# Patient Record
Sex: MALE | Race: WHITE | NOT HISPANIC OR LATINO | Employment: OTHER | URBAN - METROPOLITAN AREA
[De-identification: names, ages, dates, MRNs, and addresses within clinical notes are randomized per-mention and may not be internally consistent; named-entity substitution may affect disease eponyms.]

---

## 2023-11-03 DIAGNOSIS — I10 PRIMARY HYPERTENSION: Primary | ICD-10-CM

## 2023-11-03 RX ORDER — AMLODIPINE AND BENAZEPRIL HYDROCHLORIDE 5; 20 MG/1; MG/1
1 CAPSULE ORAL 2 TIMES DAILY
COMMUNITY
End: 2024-02-26 | Stop reason: WASHOUT

## 2023-11-06 RX ORDER — AMLODIPINE AND BENAZEPRIL HYDROCHLORIDE 5; 20 MG/1; MG/1
1 CAPSULE ORAL 2 TIMES DAILY
Qty: 180 CAPSULE | Refills: 1 | Status: SHIPPED | OUTPATIENT
Start: 2023-11-06 | End: 2024-03-04

## 2023-11-07 DIAGNOSIS — F41.9 ANXIETY: Primary | ICD-10-CM

## 2023-11-07 DIAGNOSIS — G62.89 OTHER SPECIFIED POLYNEUROPATHIES: ICD-10-CM

## 2023-11-09 RX ORDER — TRAZODONE HYDROCHLORIDE 50 MG/1
50 TABLET ORAL NIGHTLY PRN
Qty: 90 TABLET | Refills: 0 | Status: SHIPPED | OUTPATIENT
Start: 2023-11-09 | End: 2023-11-28

## 2023-11-09 RX ORDER — HYDROCHLOROTHIAZIDE 25 MG/1
25 TABLET ORAL DAILY
Qty: 90 TABLET | Refills: 1 | Status: SHIPPED | OUTPATIENT
Start: 2023-11-09 | End: 2024-02-26 | Stop reason: SDUPTHER

## 2023-11-24 DIAGNOSIS — F41.9 ANXIETY: ICD-10-CM

## 2023-11-28 PROBLEM — R53.83 FATIGUE: Status: ACTIVE | Noted: 2023-11-28

## 2023-11-28 PROBLEM — C18.9 ADENOCARCINOMA OF COLON (MULTI): Status: ACTIVE | Noted: 2023-11-28

## 2023-11-28 PROBLEM — M79.10 MYALGIA: Status: ACTIVE | Noted: 2023-11-28

## 2023-11-28 PROBLEM — E66.9 OBESITY (BMI 30-39.9): Status: ACTIVE | Noted: 2023-11-28

## 2023-11-28 PROBLEM — M17.10 ARTHRITIS OF KNEE: Status: ACTIVE | Noted: 2023-11-28

## 2023-11-28 PROBLEM — M1A.0790 IDIOPATHIC CHRONIC GOUT OF FOOT WITHOUT TOPHUS: Status: ACTIVE | Noted: 2023-11-28

## 2023-11-28 PROBLEM — M79.89 SWELLING OF BOTH LOWER EXTREMITIES: Status: ACTIVE | Noted: 2023-11-28

## 2023-11-28 PROBLEM — C18.9 COLON CANCER (MULTI): Status: ACTIVE | Noted: 2023-11-28

## 2023-11-28 PROBLEM — I10 HTN (HYPERTENSION): Status: ACTIVE | Noted: 2023-11-28

## 2023-11-28 PROBLEM — E78.1 HYPERTRIGLYCERIDEMIA: Status: ACTIVE | Noted: 2023-11-28

## 2023-11-28 PROBLEM — R07.9 CHEST PAIN: Status: ACTIVE | Noted: 2023-11-28

## 2023-11-28 PROBLEM — M25.562 BILATERAL KNEE PAIN: Status: ACTIVE | Noted: 2023-11-28

## 2023-11-28 PROBLEM — R11.0 NAUSEA IN ADULT: Status: ACTIVE | Noted: 2023-11-28

## 2023-11-28 PROBLEM — R19.5 POSITIVE COLORECTAL CANCER SCREENING USING DNA-BASED STOOL TEST: Status: ACTIVE | Noted: 2023-11-28

## 2023-11-28 PROBLEM — Z20.822 EXPOSURE TO COVID-19 VIRUS: Status: ACTIVE | Noted: 2023-11-28

## 2023-11-28 PROBLEM — Z90.49 STATUS POST PARTIAL COLECTOMY: Status: ACTIVE | Noted: 2023-11-28

## 2023-11-28 PROBLEM — R74.8 ELEVATED LIVER ENZYMES: Status: ACTIVE | Noted: 2023-11-28

## 2023-11-28 PROBLEM — M1A.9XX0 CHRONIC GOUT: Status: ACTIVE | Noted: 2023-11-28

## 2023-11-28 PROBLEM — M25.561 BILATERAL KNEE PAIN: Status: ACTIVE | Noted: 2023-11-28

## 2023-11-28 PROBLEM — G62.9 PERIPHERAL NEUROPATHY: Status: ACTIVE | Noted: 2023-11-28

## 2023-11-28 RX ORDER — ACETAMINOPHEN AND CODEINE PHOSPHATE 300; 30 MG/1; MG/1
1 TABLET ORAL EVERY 6 HOURS PRN
COMMUNITY
Start: 2023-08-08 | End: 2024-02-26 | Stop reason: WASHOUT

## 2023-11-28 RX ORDER — HYDROCHLOROTHIAZIDE 12.5 MG/1
1 TABLET ORAL DAILY
COMMUNITY
Start: 2022-04-06 | End: 2024-02-26 | Stop reason: WASHOUT

## 2023-11-28 RX ORDER — TRAZODONE HYDROCHLORIDE 50 MG/1
50 TABLET ORAL NIGHTLY PRN
Qty: 90 TABLET | Refills: 0 | Status: SHIPPED | OUTPATIENT
Start: 2023-11-28

## 2023-11-28 RX ORDER — OXYCODONE AND ACETAMINOPHEN 5; 325 MG/1; MG/1
1 TABLET ORAL EVERY 4 HOURS PRN
COMMUNITY
End: 2024-02-26 | Stop reason: WASHOUT

## 2023-11-28 RX ORDER — TERBINAFINE HYDROCHLORIDE 250 MG/1
1 TABLET ORAL DAILY
COMMUNITY
Start: 2023-11-07 | End: 2023-12-07

## 2023-11-28 RX ORDER — PANTOPRAZOLE SODIUM 40 MG/1
1 TABLET, DELAYED RELEASE ORAL 2 TIMES DAILY
COMMUNITY
Start: 2023-09-05

## 2023-11-28 RX ORDER — TIZANIDINE 2 MG/1
1 TABLET ORAL NIGHTLY
COMMUNITY
End: 2024-05-13

## 2023-11-28 RX ORDER — METOPROLOL SUCCINATE 25 MG/1
1 TABLET, EXTENDED RELEASE ORAL DAILY
COMMUNITY
Start: 2023-11-24 | End: 2024-02-26 | Stop reason: DRUGHIGH

## 2024-02-12 DIAGNOSIS — M79.10 MYALGIA, UNSPECIFIED SITE: ICD-10-CM

## 2024-02-12 DIAGNOSIS — I10 ESSENTIAL (PRIMARY) HYPERTENSION: ICD-10-CM

## 2024-02-13 RX ORDER — METOPROLOL SUCCINATE 25 MG/1
25 TABLET, EXTENDED RELEASE ORAL DAILY
Qty: 90 TABLET | Refills: 1 | OUTPATIENT
Start: 2024-02-13

## 2024-02-13 RX ORDER — TIZANIDINE 2 MG/1
2 TABLET ORAL NIGHTLY
Qty: 90 TABLET | Refills: 1 | OUTPATIENT
Start: 2024-02-13

## 2024-02-25 PROBLEM — C18.9 ADENOCARCINOMA OF COLON (MULTI): Status: RESOLVED | Noted: 2023-11-28 | Resolved: 2024-02-25

## 2024-02-25 PROBLEM — Z20.822 EXPOSURE TO COVID-19 VIRUS: Status: RESOLVED | Noted: 2023-11-28 | Resolved: 2024-02-25

## 2024-02-25 PROBLEM — M1A.0790 IDIOPATHIC CHRONIC GOUT OF FOOT WITHOUT TOPHUS: Status: RESOLVED | Noted: 2023-11-28 | Resolved: 2024-02-25

## 2024-02-26 ENCOUNTER — OFFICE VISIT (OUTPATIENT)
Dept: PRIMARY CARE | Facility: CLINIC | Age: 64
End: 2024-02-26
Payer: COMMERCIAL

## 2024-02-26 VITALS
DIASTOLIC BLOOD PRESSURE: 68 MMHG | SYSTOLIC BLOOD PRESSURE: 110 MMHG | TEMPERATURE: 97.7 F | HEART RATE: 75 BPM | OXYGEN SATURATION: 98 % | WEIGHT: 267.4 LBS | BODY MASS INDEX: 32.56 KG/M2 | HEIGHT: 76 IN

## 2024-02-26 DIAGNOSIS — Z90.49 STATUS POST PARTIAL COLECTOMY: ICD-10-CM

## 2024-02-26 DIAGNOSIS — R23.3 EASY BRUISING: ICD-10-CM

## 2024-02-26 DIAGNOSIS — G62.89 OTHER POLYNEUROPATHY: ICD-10-CM

## 2024-02-26 DIAGNOSIS — C18.9 MALIGNANT NEOPLASM OF COLON, UNSPECIFIED PART OF COLON (MULTI): ICD-10-CM

## 2024-02-26 DIAGNOSIS — R25.1 TREMOR: Primary | ICD-10-CM

## 2024-02-26 DIAGNOSIS — R35.1 NOCTURIA: ICD-10-CM

## 2024-02-26 DIAGNOSIS — F10.20 ALCOHOL DEPENDENCE, DAILY USE (MULTI): ICD-10-CM

## 2024-02-26 DIAGNOSIS — R53.82 CHRONIC FATIGUE: ICD-10-CM

## 2024-02-26 DIAGNOSIS — R26.9 GAIT DIFFICULTY: ICD-10-CM

## 2024-02-26 DIAGNOSIS — R94.5 ABNORMAL LIVER FUNCTION: ICD-10-CM

## 2024-02-26 DIAGNOSIS — M10.9 GOUT, UNSPECIFIED CAUSE, UNSPECIFIED CHRONICITY, UNSPECIFIED SITE: ICD-10-CM

## 2024-02-26 DIAGNOSIS — R11.0 NAUSEA: ICD-10-CM

## 2024-02-26 DIAGNOSIS — M25.511 RIGHT SHOULDER PAIN, UNSPECIFIED CHRONICITY: ICD-10-CM

## 2024-02-26 DIAGNOSIS — E78.1 HYPERTRIGLYCERIDEMIA: ICD-10-CM

## 2024-02-26 DIAGNOSIS — I10 PRIMARY HYPERTENSION: ICD-10-CM

## 2024-02-26 PROBLEM — R19.5 POSITIVE COLORECTAL CANCER SCREENING USING DNA-BASED STOOL TEST: Status: RESOLVED | Noted: 2023-11-28 | Resolved: 2024-02-26

## 2024-02-26 PROBLEM — Z98.890 HISTORY OF BACK SURGERY: Status: ACTIVE | Noted: 2024-02-26

## 2024-02-26 PROBLEM — R07.9 CHEST PAIN: Status: RESOLVED | Noted: 2023-11-28 | Resolved: 2024-02-26

## 2024-02-26 PROBLEM — M25.519 SHOULDER PAIN: Status: ACTIVE | Noted: 2024-02-26

## 2024-02-26 PROBLEM — M79.89 SWELLING OF BOTH LOWER EXTREMITIES: Status: RESOLVED | Noted: 2023-11-28 | Resolved: 2024-02-26

## 2024-02-26 PROBLEM — M79.10 MYALGIA: Status: RESOLVED | Noted: 2023-11-28 | Resolved: 2024-02-26

## 2024-02-26 PROCEDURE — 99214 OFFICE O/P EST MOD 30 MIN: CPT | Performed by: INTERNAL MEDICINE

## 2024-02-26 PROCEDURE — 3078F DIAST BP <80 MM HG: CPT | Performed by: INTERNAL MEDICINE

## 2024-02-26 PROCEDURE — 3074F SYST BP LT 130 MM HG: CPT | Performed by: INTERNAL MEDICINE

## 2024-02-26 PROCEDURE — 1036F TOBACCO NON-USER: CPT | Performed by: INTERNAL MEDICINE

## 2024-02-26 RX ORDER — ALLOPURINOL 300 MG/1
300 TABLET ORAL DAILY PRN
COMMUNITY
Start: 2017-09-27

## 2024-02-26 RX ORDER — METOPROLOL SUCCINATE 50 MG/1
50 TABLET, EXTENDED RELEASE ORAL DAILY
Qty: 90 TABLET | Refills: 3 | Status: SHIPPED | OUTPATIENT
Start: 2024-02-26

## 2024-02-26 ASSESSMENT — PATIENT HEALTH QUESTIONNAIRE - PHQ9
SUM OF ALL RESPONSES TO PHQ9 QUESTIONS 1 & 2: 0
2. FEELING DOWN, DEPRESSED OR HOPELESS: NOT AT ALL
1. LITTLE INTEREST OR PLEASURE IN DOING THINGS: NOT AT ALL

## 2024-02-26 ASSESSMENT — PAIN SCALES - GENERAL: PAINLEVEL: 2

## 2024-02-26 NOTE — PROGRESS NOTES
"Standard Progress Note  Chief Complaint   Patient presents with    Med Refill    Establish Care     Former pt of Dr. Lee here to establish with Dr. Salomon and needs refills.       HPI:  Oswaldo Aguilar 63 y.o.   male    Patient Active Problem List   Diagnosis    Arthritis of knee    Bilateral knee pain    Chronic gout    Colon cancer (CMS/HCC)    Elevated liver enzymes    Fatigue    HTN (hypertension)    Hypertriglyceridemia    Obesity (BMI 30-39.9)    Peripheral neuropathy    Status post partial colectomy    Shoulder pain    Tremor    History of back surgery    Alcohol dependence, daily use (CMS/HCC)           Past Surgical History:   Procedure Laterality Date    OTHER SURGICAL HISTORY  2022    Large bowel biopsy    SPINE SURGERY         Social History     Social History Narrative    . No children    Disabled from back issues. Previously [employer]    +drinks beer daily       Family History   Problem Relation Name Age of Onset    Psoriasis Mother           55    Liver disease Mother      Coronary artery disease Father           67    Emphysema Father      Gout Father's Brother          Blood pressure 110/68, pulse 75, temperature 36.5 °C (97.7 °F), height 1.93 m (6' 4\"), weight 121 kg (267 lb 6.4 oz), SpO2 98 %.  Body mass index is 32.55 kg/m².          Vital reviewed  NC/AT  Eyes: PERRL,   Neck: no cervical/clavicular adenopathy. No goiter  CV: RRR S1 S2 normal. No murmur. No carotid bruit.   Lungs: CTA without wrr. Breath sounds symmetric  Abdomen: normoactive. Soft, nontender.limited exam. Surgical scars.   Extremities: no pretibial edema  Neuro: speech intact.   Skin: no rash noted.       Advised to stop hydrochlorothiazide as bp is under control and has history of gout.   Regarding colon cancer he is planning to follow up with GI. He thinks he is due for scope.     Problem List Items Addressed This Visit       Colon cancer (CMS/HCC)    Overview     2022. " Surgery. GI: Dr Fajardo. Surgeon: Dr MAGDALENA King.          Fatigue    Relevant Orders    Vitamin B12    Folate    Magnesium    HTN (hypertension)    Relevant Medications    metoprolol succinate XL (Toprol-XL) 50 mg 24 hr tablet    Hypertriglyceridemia    Relevant Orders    Comprehensive Metabolic Panel    Lipid panel    Peripheral neuropathy    Relevant Orders    Vitamin B12    Magnesium    Status post partial colectomy    Shoulder pain    Tremor - Primary    Relevant Orders    Referral to Neurology    Tsh With Reflex To Free T4 If Abnormal    Alcohol dependence, daily use (CMS/HCC)     Other Visit Diagnoses       Gait difficulty        Relevant Orders    Referral to Neurology    Nausea        Relevant Orders    Comprehensive Metabolic Panel    Magnesium    Abnormal liver function        Relevant Orders    Comprehensive Metabolic Panel    CBC    Vitamin B12    Folate    Nocturia        Relevant Orders    PSA    Easy bruising        Relevant Orders    Protime-INR    APTT    Gout, unspecified cause, unspecified chronicity, unspecified site        Relevant Orders    Uric acid                  Current Outpatient Medications on File Prior to Visit   Medication Sig Dispense Refill    allopurinol (Zyloprim) 300 mg tablet Take 1 tablet (300 mg) by mouth once daily as needed.      amLODIPine-benazepriL (Lotrel) 5-20 mg capsule TAKE 1 CAPSULE BY MOUTH TWICE A  capsule 1    pantoprazole (ProtoNix) 40 mg EC tablet Take 1 tablet (40 mg) by mouth 2 times a day.      tiZANidine (Zanaflex) 2 mg tablet Take 0.5 tablets (1 mg) by mouth once daily at bedtime.      traZODone (Desyrel) 50 mg tablet TAKE 1 TABLET BY MOUTH EVERY DAY AT BEDTIME AS NEEDED 90 tablet 0    [DISCONTINUED] hydroCHLOROthiazide (HYDRODiuril) 12.5 mg tablet Take 1 tablet (12.5 mg) by mouth once daily.      [DISCONTINUED] metoprolol succinate XL (Toprol-XL) 25 mg 24 hr tablet Take 1 tablet (25 mg) by mouth once daily.      [DISCONTINUED]  acetaminophen-codeine (Tylenol w/ Codeine #3) 300-30 mg tablet Take 1 tablet by mouth every 6 hours if needed. To 8 hours PRN      [DISCONTINUED] amLODIPine-benazepriL (Lotrel) 5-20 mg capsule Take 1 capsule by mouth 2 times a day.      [DISCONTINUED] hydroCHLOROthiazide (HYDRODiuril) 25 mg tablet TAKE 1 TABLET BY MOUTH EVERY DAY 90 tablet 1    [DISCONTINUED] oxyCODONE-acetaminophen (Percocet) 5-325 mg tablet Take 1 tablet by mouth every 4 hours if needed.       No current facility-administered medications on file prior to visit.         Kassy Salomon MD

## 2024-03-04 DIAGNOSIS — I10 PRIMARY HYPERTENSION: ICD-10-CM

## 2024-03-04 RX ORDER — AMLODIPINE AND BENAZEPRIL HYDROCHLORIDE 5; 20 MG/1; MG/1
1 CAPSULE ORAL 2 TIMES DAILY
Qty: 180 CAPSULE | Refills: 0 | Status: SHIPPED | OUTPATIENT
Start: 2024-03-04 | End: 2024-05-17

## 2024-03-06 ENCOUNTER — TELEPHONE (OUTPATIENT)
Dept: PRIMARY CARE | Facility: CLINIC | Age: 64
End: 2024-03-06
Payer: COMMERCIAL

## 2024-03-06 NOTE — TELEPHONE ENCOUNTER
----- Message from Kassy Chavez MD sent at 3/4/2024  9:25 AM EST -----  Refilled medication. Please obtain lab work as overdue to have electrolytes and kidney function checked.

## 2024-03-06 NOTE — TELEPHONE ENCOUNTER
"Called pt and informed.  States \"We need a list of the labs because we don't know where we need to go now that you've moved.\"  Mailed lab orders and list of [facility] labs to pt.  "

## 2024-05-05 DIAGNOSIS — I10 PRIMARY HYPERTENSION: ICD-10-CM

## 2024-05-09 ENCOUNTER — APPOINTMENT (OUTPATIENT)
Dept: NEUROLOGY | Facility: CLINIC | Age: 64
End: 2024-05-09
Payer: COMMERCIAL

## 2024-05-13 DIAGNOSIS — I10 HYPERTENSION, UNSPECIFIED TYPE: ICD-10-CM

## 2024-05-13 DIAGNOSIS — Z98.890 HISTORY OF BACK SURGERY: Primary | ICD-10-CM

## 2024-05-13 RX ORDER — TIZANIDINE 2 MG/1
2 TABLET ORAL NIGHTLY
Qty: 90 TABLET | Refills: 1 | Status: SHIPPED | OUTPATIENT
Start: 2024-05-13

## 2024-05-13 RX ORDER — METOPROLOL SUCCINATE 25 MG/1
25 TABLET, EXTENDED RELEASE ORAL DAILY
Qty: 90 TABLET | Refills: 1 | Status: SHIPPED | OUTPATIENT
Start: 2024-05-13

## 2024-05-14 RX ORDER — AMLODIPINE AND BENAZEPRIL HYDROCHLORIDE 5; 20 MG/1; MG/1
1 CAPSULE ORAL 2 TIMES DAILY
Qty: 180 CAPSULE | Refills: 0 | OUTPATIENT
Start: 2024-05-14

## 2024-05-17 ENCOUNTER — TELEPHONE (OUTPATIENT)
Dept: PHARMACY | Facility: HOSPITAL | Age: 64
End: 2024-05-17
Payer: COMMERCIAL

## 2024-05-17 DIAGNOSIS — I10 PRIMARY HYPERTENSION: ICD-10-CM

## 2024-05-17 RX ORDER — AMLODIPINE AND BENAZEPRIL HYDROCHLORIDE 5; 20 MG/1; MG/1
1 CAPSULE ORAL 2 TIMES DAILY
Qty: 60 CAPSULE | Refills: 0 | Status: SHIPPED | OUTPATIENT
Start: 2024-05-17

## 2024-05-17 NOTE — TELEPHONE ENCOUNTER
Population Health: Outreach by Ambulatory Pharmacy Team    Payor: [employer] [place]  Reason: Adherence  Medication: AMLOD/BENAZP CAP 5-20MG  Outcome: Left Voicemail    Thomas Vanegas, PharmD

## 2024-05-20 NOTE — TELEPHONE ENCOUNTER
----- Message from Kassy Chavez MD sent at 5/17/2024  4:12 PM EDT -----  Refilled medication for 30 days. Looks like needs labs. Please obtain labs as ordered.      Methotrexate Pregnancy And Lactation Text: This medication is Pregnancy Category X and is known to cause fetal harm. This medication is excreted in breast milk.

## 2024-05-21 NOTE — TELEPHONE ENCOUNTER
----- Message from Kassy Chavez MD sent at 5/17/2024  4:12 PM EDT -----  Refilled medication for 30 days. Looks like needs labs. Please obtain labs as ordered.

## 2024-06-03 ENCOUNTER — PATIENT OUTREACH (OUTPATIENT)
Dept: CARDIOLOGY | Facility: CLINIC | Age: 64
End: 2024-06-03
Payer: COMMERCIAL

## 2024-06-03 NOTE — PROGRESS NOTES
Discharge Facility:  [facility]  Discharge Diagnosis:  Lower GI Bleed, Rectal bleed  Admission Date:  5/29/24  Discharge Date: 6/1/24    PCP Appointment Date: Dr Chavez sending a task for sooner appt  Specialist Appointment Date: Neuro 8/20/24  Hospital Encounter and Summary: Linked      Two attempts were made to reach patient within two business days after discharge. Voicemail left with contact information for patient to call back with any non-emergent questions or concerns.

## 2024-06-05 ENCOUNTER — TELEPHONE (OUTPATIENT)
Dept: PRIMARY CARE | Facility: CLINIC | Age: 64
End: 2024-06-05
Payer: COMMERCIAL

## 2024-06-05 NOTE — TELEPHONE ENCOUNTER
----- Message from Kassy Chavez MD sent at 6/4/2024  6:03 PM EDT -----  Check with Dr Fajardo's office.   ----- Message -----  From: Karis Hayes LPN  Sent: 6/4/2024   9:22 AM EDT  To: Kassy Chavez MD    Did you want me to check with the pt or with a doctors office?  ----- Message -----  From: Kassy Chavez MD  Sent: 6/3/2024   5:11 PM EDT  To: Karis Hayes LPN    Received notes from scopes. Indicates vocal cord lesion. Did they recommend pt see ENT?

## 2024-06-05 NOTE — TELEPHONE ENCOUNTER
Called office of Dr. Fajardo and spoke with Suzanne.  Will send message to MA to contact clinical.

## 2024-06-05 NOTE — TELEPHONE ENCOUNTER
Called pt.  Was advised to follow up with ENT, but have not made an appt yet.  Provided with recommended names and phone numbers.

## 2024-06-05 NOTE — TELEPHONE ENCOUNTER
"Received return call from Laura at Dr. Fajardo's office.  Scope done by Dr. Gardner on 5/31/24 states \"Consider ENT evaluation of vocal cord nodule.\"  "

## 2024-06-14 ENCOUNTER — APPOINTMENT (OUTPATIENT)
Dept: PRIMARY CARE | Facility: CLINIC | Age: 64
End: 2024-06-14
Payer: COMMERCIAL

## 2024-06-14 VITALS
TEMPERATURE: 97.5 F | BODY MASS INDEX: 32.1 KG/M2 | SYSTOLIC BLOOD PRESSURE: 102 MMHG | WEIGHT: 263.6 LBS | HEIGHT: 76 IN | DIASTOLIC BLOOD PRESSURE: 62 MMHG | OXYGEN SATURATION: 98 % | HEART RATE: 68 BPM

## 2024-06-14 DIAGNOSIS — R79.0 LOW MAGNESIUM LEVEL: ICD-10-CM

## 2024-06-14 DIAGNOSIS — F10.20 ALCOHOL DEPENDENCE, DAILY USE (MULTI): ICD-10-CM

## 2024-06-14 DIAGNOSIS — K92.2 GASTROINTESTINAL HEMORRHAGE, UNSPECIFIED GASTROINTESTINAL HEMORRHAGE TYPE: Primary | ICD-10-CM

## 2024-06-14 DIAGNOSIS — D64.9 ANEMIA, UNSPECIFIED TYPE: ICD-10-CM

## 2024-06-14 PROBLEM — Z98.1 S/P LUMBAR FUSION: Status: ACTIVE | Noted: 2018-12-13

## 2024-06-14 PROBLEM — M1A.9XX0 CHRONIC GOUT: Status: RESOLVED | Noted: 2023-11-28 | Resolved: 2024-06-14

## 2024-06-14 PROBLEM — K21.9 GASTROESOPHAGEAL REFLUX DISEASE WITHOUT ESOPHAGITIS: Status: ACTIVE | Noted: 2024-06-14

## 2024-06-14 PROBLEM — M17.0 PRIMARY OSTEOARTHRITIS OF BOTH KNEES: Status: ACTIVE | Noted: 2018-12-13

## 2024-06-14 PROBLEM — M96.1 POSTLAMINECTOMY SYNDROME OF LUMBAR REGION: Status: ACTIVE | Noted: 2019-02-12

## 2024-06-14 RX ORDER — HYDROCHLOROTHIAZIDE 25 MG/1
1 TABLET ORAL
COMMUNITY
Start: 2024-05-17 | End: 2024-06-14 | Stop reason: WASHOUT

## 2024-06-14 RX ORDER — CHOLECALCIFEROL (VITAMIN D3) 50 MCG
50 TABLET ORAL DAILY
COMMUNITY

## 2024-06-14 ASSESSMENT — PATIENT HEALTH QUESTIONNAIRE - PHQ9
SUM OF ALL RESPONSES TO PHQ9 QUESTIONS 1 & 2: 2
2. FEELING DOWN, DEPRESSED OR HOPELESS: SEVERAL DAYS
10. IF YOU CHECKED OFF ANY PROBLEMS, HOW DIFFICULT HAVE THESE PROBLEMS MADE IT FOR YOU TO DO YOUR WORK, TAKE CARE OF THINGS AT HOME, OR GET ALONG WITH OTHER PEOPLE: NOT DIFFICULT AT ALL
1. LITTLE INTEREST OR PLEASURE IN DOING THINGS: SEVERAL DAYS

## 2024-06-14 ASSESSMENT — PAIN SCALES - GENERAL: PAINLEVEL: 3

## 2024-06-14 NOTE — PROGRESS NOTES
Standard Progress Note  Chief Complaint   Patient presents with    Hospital Follow-up     Pt here for FUV discharge from [facility] on 6/1/24   Accompanied by family.  Recent admission for lower GI bleed.  Per records have scallops.  This included EGD and colonoscopy. 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  Colonoscopy showed diverticulosis, partial cecectomy, partial left hemicolectomy.  EGD showed nodule left vocal cord and area with appearance of Ambrose's.  He presents for follow-up of hospitalization.  No bleeding since he left the hospital.  He states that he drinks about 6-12 beers a day.  He has done this for many years.  He states that his family he has always done this.  We discussed that this is on excessive amount of alcohol and concern regarding potential liver damage.  Did advise him to check with gastroenterologist regarding doing imaging of the liver.  We also discussed that during his admission his liver tests were abnormal.    He has an appointment with ENT on June 19 Dr Aponte    HPI:  Oswaldo Jeff 63 y.o.   male hospital follow.     Patient Active Problem List   Diagnosis    Arthritis of knee    Bilateral knee pain    Colon cancer (Multi)    Elevated liver enzymes    Fatigue    HTN (hypertension)    Hypertriglyceridemia    Obesity (BMI 30-39.9)    Peripheral neuropathy    Status post partial colectomy    Shoulder pain    Tremor    History of back surgery    Alcohol dependence, daily use (Multi)    Gout    Spinal stenosis of lumbar region    S/P lumbar fusion    Primary osteoarthritis of both knees    Postlaminectomy syndrome of lumbar region    Gastroesophageal reflux disease without esophagitis           Past Surgical History:   Procedure Laterality Date    OTHER SURGICAL HISTORY  11/08/2022    Large bowel biopsy    SPINE SURGERY         Social History     Social History Narrative    . No children    Disabled from back issues. Previously [employer]    +drinks  "beer daily       Family History   Problem Relation Name Age of Onset    Psoriasis Mother           55    Liver disease Mother      Coronary artery disease Father           67    Emphysema Father      Gout Father's Brother          Blood pressure 102/62, pulse 68, temperature 36.4 °C (97.5 °F), height 1.93 m (6' 4\"), weight 120 kg (263 lb 9.6 oz), SpO2 98%.  Body mass index is 32.09 kg/m².  1. Gastrointestinal hemorrhage, unspecified gastrointestinal hemorrhage type  No problem with bleeding since his admission.  He is not having any constipation now.  He is taking Colace.  He did have constipation prior to his admission and states that he was straining a lot.  He is no longer doing this.  Will check lab work now.  He has follow-up with gastroenterologist.  - CBC; Future  - Comprehensive Metabolic Panel; Future    2. Anemia, unspecified type  .  As above we will obtain lab work.  - CBC; Future  - Comprehensive Metabolic Panel; Future    3. Alcohol dependence, daily use (Multi)  Long discussion recommend folic acid B complex vitamin.  Advised.  He did state easier said then done.  Advised him to talk to gastroenterologist regarding imaging to try and get a better sense of amount of liver damage has occurred.  - Comprehensive Metabolic Panel; Future    After visit added level to labs.    He is scheduled for annual wellness later this month.  Since he was seen here today he can move that appointment to anytime in  or September for his annual wellness  Current Outpatient Medications on File Prior to Visit   Medication Sig Dispense Refill    allopurinol (Zyloprim) 300 mg tablet Take 1 tablet (300 mg) by mouth once daily as needed.      amLODIPine-benazepriL (Lotrel) 5-20 mg capsule TAKE 1 CAPSULE BY MOUTH TWICE A DAY 60 capsule 0    cholecalciferol (Vitamin D3) 50 MCG (2000) tablet Take 1 tablet (50 mcg) by mouth once daily.      hydroCHLOROthiazide (HYDRODiuril) 25 mg tablet Take 1 tablet " (25 mg) by mouth early in the morning..      metoprolol succinate XL (Toprol-XL) 50 mg 24 hr tablet Take 1 tablet (50 mg) by mouth once daily. Do not crush or chew. 90 tablet 3    pantoprazole (ProtoNix) 40 mg EC tablet Take 1 tablet (40 mg) by mouth 2 times a day.      metoprolol succinate XL (Toprol-XL) 25 mg 24 hr tablet TAKE 1 TABLET BY MOUTH EVERY DAY (Patient not taking: Reported on 6/14/2024) 90 tablet 1    tiZANidine (Zanaflex) 2 mg tablet TAKE 1 TABLET BY MOUTH EVERYDAY AT BEDTIME (Patient not taking: Reported on 6/14/2024) 90 tablet 1    traZODone (Desyrel) 50 mg tablet TAKE 1 TABLET BY MOUTH EVERY DAY AT BEDTIME AS NEEDED (Patient not taking: Reported on 6/14/2024) 90 tablet 0     No current facility-administered medications on file prior to visit.         Kassy Salomon MD

## 2024-06-24 ENCOUNTER — PATIENT OUTREACH (OUTPATIENT)
Dept: CARDIOLOGY | Facility: CLINIC | Age: 64
End: 2024-06-24

## 2024-06-24 ENCOUNTER — APPOINTMENT (OUTPATIENT)
Dept: PRIMARY CARE | Facility: CLINIC | Age: 64
End: 2024-06-24
Payer: MEDICAID

## 2024-06-24 NOTE — PROGRESS NOTES
Unable to reach patient for call back after patient's follow up appointment with PCP.   HEROM with call back number for patient to call if needed   If no voicemail available call attempts x 2 were made to contact the patient to assist with any questions or concerns patient may have.

## 2024-07-22 ENCOUNTER — APPOINTMENT (OUTPATIENT)
Dept: PRIMARY CARE | Facility: CLINIC | Age: 64
End: 2024-07-22
Payer: MEDICAID

## 2024-08-12 NOTE — ADDENDUM NOTE
Addended by: ESSIE MEYER on: 8/12/2024 08:45 AM     Modules accepted: Orders     Additional Notes: Patient consent was obtained to proceed with the visit and recommended plan of care after discussion of all risks and benefits, including the risks of COVID-19 exposure. Detail Level: Simple Detail Level: Zone Additional Notes: Refer to Dr. Tran or PCP for scar revision consult

## 2024-08-17 DIAGNOSIS — G62.89 OTHER SPECIFIED POLYNEUROPATHIES: ICD-10-CM

## 2024-08-19 RX ORDER — HYDROCHLOROTHIAZIDE 25 MG/1
25 TABLET ORAL DAILY
Qty: 90 TABLET | Refills: 0 | Status: SHIPPED | OUTPATIENT
Start: 2024-08-19 | End: 2024-08-23 | Stop reason: SDUPTHER

## 2024-08-20 ENCOUNTER — APPOINTMENT (OUTPATIENT)
Dept: NEUROLOGY | Facility: CLINIC | Age: 64
End: 2024-08-20
Payer: COMMERCIAL

## 2024-08-23 ENCOUNTER — LAB (OUTPATIENT)
Dept: LAB | Facility: LAB | Age: 64
End: 2024-08-23
Payer: COMMERCIAL

## 2024-08-23 ENCOUNTER — OFFICE VISIT (OUTPATIENT)
Dept: PRIMARY CARE | Facility: CLINIC | Age: 64
End: 2024-08-23
Payer: COMMERCIAL

## 2024-08-23 VITALS
WEIGHT: 265.2 LBS | HEART RATE: 100 BPM | OXYGEN SATURATION: 97 % | BODY MASS INDEX: 32.29 KG/M2 | TEMPERATURE: 98 F | SYSTOLIC BLOOD PRESSURE: 140 MMHG | DIASTOLIC BLOOD PRESSURE: 90 MMHG | HEIGHT: 76 IN

## 2024-08-23 DIAGNOSIS — K21.9 GASTROESOPHAGEAL REFLUX DISEASE WITHOUT ESOPHAGITIS: Primary | ICD-10-CM

## 2024-08-23 DIAGNOSIS — M10.9 GOUT, UNSPECIFIED CAUSE, UNSPECIFIED CHRONICITY, UNSPECIFIED SITE: ICD-10-CM

## 2024-08-23 DIAGNOSIS — I10 PRIMARY HYPERTENSION: ICD-10-CM

## 2024-08-23 DIAGNOSIS — E78.1 HYPERTRIGLYCERIDEMIA: ICD-10-CM

## 2024-08-23 DIAGNOSIS — R79.0 LOW MAGNESIUM LEVEL: ICD-10-CM

## 2024-08-23 DIAGNOSIS — R23.3 EASY BRUISING: ICD-10-CM

## 2024-08-23 DIAGNOSIS — D64.9 ANEMIA, UNSPECIFIED TYPE: ICD-10-CM

## 2024-08-23 DIAGNOSIS — G62.89 OTHER POLYNEUROPATHY: ICD-10-CM

## 2024-08-23 DIAGNOSIS — R11.0 NAUSEA: ICD-10-CM

## 2024-08-23 DIAGNOSIS — Z11.4 SCREENING FOR HIV (HUMAN IMMUNODEFICIENCY VIRUS): ICD-10-CM

## 2024-08-23 DIAGNOSIS — G62.89 OTHER SPECIFIED POLYNEUROPATHIES: ICD-10-CM

## 2024-08-23 DIAGNOSIS — J38.2 NODULES OF VOCAL CORDS: ICD-10-CM

## 2024-08-23 DIAGNOSIS — R25.1 TREMOR: ICD-10-CM

## 2024-08-23 DIAGNOSIS — R71.8 MICROCYTOSIS: Primary | ICD-10-CM

## 2024-08-23 DIAGNOSIS — Z00.00 ROUTINE GENERAL MEDICAL EXAMINATION AT HEALTH CARE FACILITY: ICD-10-CM

## 2024-08-23 DIAGNOSIS — Z11.59 NEED FOR HEPATITIS C SCREENING TEST: ICD-10-CM

## 2024-08-23 DIAGNOSIS — R35.1 NOCTURIA: ICD-10-CM

## 2024-08-23 DIAGNOSIS — F10.20 ALCOHOL DEPENDENCE, DAILY USE (MULTI): ICD-10-CM

## 2024-08-23 DIAGNOSIS — R53.82 CHRONIC FATIGUE: ICD-10-CM

## 2024-08-23 DIAGNOSIS — K92.2 GASTROINTESTINAL HEMORRHAGE, UNSPECIFIED GASTROINTESTINAL HEMORRHAGE TYPE: ICD-10-CM

## 2024-08-23 DIAGNOSIS — R94.5 ABNORMAL LIVER FUNCTION: ICD-10-CM

## 2024-08-23 LAB
ALBUMIN SERPL BCP-MCNC: 4 G/DL (ref 3.4–5)
ALP SERPL-CCNC: 51 U/L (ref 33–136)
ALT SERPL W P-5'-P-CCNC: 108 U/L (ref 10–52)
ANION GAP SERPL CALC-SCNC: 17 MMOL/L (ref 10–20)
AST SERPL W P-5'-P-CCNC: 151 U/L (ref 9–39)
BILIRUB SERPL-MCNC: 0.8 MG/DL (ref 0–1.2)
BUN SERPL-MCNC: 7 MG/DL (ref 6–23)
CALCIUM SERPL-MCNC: 9.2 MG/DL (ref 8.6–10.3)
CHLORIDE SERPL-SCNC: 103 MMOL/L (ref 98–107)
CHOLEST SERPL-MCNC: 140 MG/DL (ref 0–199)
CHOLESTEROL/HDL RATIO: 2
CO2 SERPL-SCNC: 24 MMOL/L (ref 21–32)
CREAT SERPL-MCNC: 0.76 MG/DL (ref 0.5–1.3)
EGFRCR SERPLBLD CKD-EPI 2021: >90 ML/MIN/1.73M*2
ERYTHROCYTE [DISTWIDTH] IN BLOOD BY AUTOMATED COUNT: 18.6 % (ref 11.5–14.5)
FERRITIN SERPL-MCNC: 18 NG/ML (ref 20–300)
FOLATE SERPL-MCNC: 11.3 NG/ML
GLUCOSE SERPL-MCNC: 93 MG/DL (ref 74–99)
HCT VFR BLD AUTO: 39.4 % (ref 41–52)
HCV AB SER QL: NONREACTIVE
HDLC SERPL-MCNC: 69.9 MG/DL
HGB BLD-MCNC: 11.8 G/DL (ref 13.5–17.5)
HIV 1+2 AB+HIV1 P24 AG SERPL QL IA: NONREACTIVE
IRON SATN MFR SERPL: ABNORMAL %
IRON SERPL-MCNC: 18 UG/DL (ref 35–150)
LDLC SERPL CALC-MCNC: 60 MG/DL
MAGNESIUM SERPL-MCNC: 1.77 MG/DL (ref 1.6–2.4)
MCH RBC QN AUTO: 23.3 PG (ref 26–34)
MCHC RBC AUTO-ENTMCNC: 29.9 G/DL (ref 32–36)
MCV RBC AUTO: 78 FL (ref 80–100)
NON HDL CHOLESTEROL: 70 MG/DL (ref 0–149)
NRBC BLD-RTO: 0 /100 WBCS (ref 0–0)
PLATELET # BLD AUTO: 355 X10*3/UL (ref 150–450)
POTASSIUM SERPL-SCNC: 4.5 MMOL/L (ref 3.5–5.3)
PROT SERPL-MCNC: 6.9 G/DL (ref 6.4–8.2)
PSA SERPL-MCNC: 1.22 NG/ML
RBC # BLD AUTO: 5.07 X10*6/UL (ref 4.5–5.9)
SODIUM SERPL-SCNC: 139 MMOL/L (ref 136–145)
TIBC SERPL-MCNC: ABNORMAL UG/DL
TRIGL SERPL-MCNC: 49 MG/DL (ref 0–149)
TSH SERPL-ACNC: 1.5 MIU/L (ref 0.44–3.98)
UIBC SERPL-MCNC: >450 UG/DL (ref 110–370)
URATE SERPL-MCNC: 8.5 MG/DL (ref 4–7.5)
VIT B12 SERPL-MCNC: 276 PG/ML (ref 211–911)
VLDL: 10 MG/DL (ref 0–40)
WBC # BLD AUTO: 4.3 X10*3/UL (ref 4.4–11.3)

## 2024-08-23 PROCEDURE — 82746 ASSAY OF FOLIC ACID SERUM: CPT

## 2024-08-23 PROCEDURE — 84443 ASSAY THYROID STIM HORMONE: CPT

## 2024-08-23 PROCEDURE — 83550 IRON BINDING TEST: CPT

## 2024-08-23 PROCEDURE — 84550 ASSAY OF BLOOD/URIC ACID: CPT

## 2024-08-23 PROCEDURE — 83735 ASSAY OF MAGNESIUM: CPT

## 2024-08-23 PROCEDURE — 84153 ASSAY OF PSA TOTAL: CPT

## 2024-08-23 PROCEDURE — 82607 VITAMIN B-12: CPT

## 2024-08-23 PROCEDURE — 83540 ASSAY OF IRON: CPT

## 2024-08-23 PROCEDURE — 80061 LIPID PANEL: CPT

## 2024-08-23 PROCEDURE — 86803 HEPATITIS C AB TEST: CPT

## 2024-08-23 PROCEDURE — 82728 ASSAY OF FERRITIN: CPT

## 2024-08-23 PROCEDURE — 85027 COMPLETE CBC AUTOMATED: CPT

## 2024-08-23 PROCEDURE — 87389 HIV-1 AG W/HIV-1&-2 AB AG IA: CPT

## 2024-08-23 PROCEDURE — 80053 COMPREHEN METABOLIC PANEL: CPT

## 2024-08-23 RX ORDER — HYDROCHLOROTHIAZIDE 25 MG/1
25 TABLET ORAL DAILY
Qty: 90 TABLET | Refills: 3 | Status: SHIPPED | OUTPATIENT
Start: 2024-08-23 | End: 2024-08-23 | Stop reason: WASHOUT

## 2024-08-23 RX ORDER — METOPROLOL SUCCINATE 50 MG/1
50 TABLET, EXTENDED RELEASE ORAL DAILY
Qty: 90 TABLET | Refills: 3 | Status: SHIPPED | OUTPATIENT
Start: 2024-08-23

## 2024-08-23 RX ORDER — AMLODIPINE AND BENAZEPRIL HYDROCHLORIDE 5; 20 MG/1; MG/1
1 CAPSULE ORAL 2 TIMES DAILY
Qty: 180 CAPSULE | Refills: 3 | Status: SHIPPED | OUTPATIENT
Start: 2024-08-23

## 2024-08-23 RX ORDER — PANTOPRAZOLE SODIUM 40 MG/1
40 TABLET, DELAYED RELEASE ORAL
Qty: 90 TABLET | Refills: 3 | Status: SHIPPED | OUTPATIENT
Start: 2024-08-23

## 2024-08-23 ASSESSMENT — LIFESTYLE VARIABLES
HOW OFTEN DO YOU HAVE SIX OR MORE DRINKS ON ONE OCCASION: WEEKLY
HOW MANY STANDARD DRINKS CONTAINING ALCOHOL DO YOU HAVE ON A TYPICAL DAY: 7 TO 9
AUDIT-C TOTAL SCORE: 10
SKIP TO QUESTIONS 9-10: 0
HOW OFTEN DO YOU HAVE A DRINK CONTAINING ALCOHOL: 4 OR MORE TIMES A WEEK

## 2024-08-23 ASSESSMENT — PAIN SCALES - GENERAL: PAINLEVEL: 5

## 2024-08-23 ASSESSMENT — ACTIVITIES OF DAILY LIVING (ADL)
BATHING: INDEPENDENT
DOING_HOUSEWORK: TOTAL CARE
TAKING_MEDICATION: INDEPENDENT
DRESSING: INDEPENDENT
GROCERY_SHOPPING: TOTAL CARE
MANAGING_FINANCES: INDEPENDENT

## 2024-08-23 ASSESSMENT — PATIENT HEALTH QUESTIONNAIRE - PHQ9
2. FEELING DOWN, DEPRESSED OR HOPELESS: NOT AT ALL
1. LITTLE INTEREST OR PLEASURE IN DOING THINGS: NOT AT ALL
SUM OF ALL RESPONSES TO PHQ9 QUESTIONS 1 AND 2: 0

## 2024-08-23 NOTE — PROGRESS NOTES
"Standard Progress Note  Chief Complaint   Patient presents with    Establish Care     Pt here for AWV and med refills    Medicare Annual Wellness Visit Subsequent   AWV  Last visit 06/2024   At that time -recent admission  lower GI bleed.  EGD and colonoscopy. 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  Colonoscopy showed diverticulosis, partial cecectomy, partial left hemicolectomy.  EGD showed nodule left vocal cord and area with appearance of Ambrose's.  drinks about 6-12 beers a day.  He has done this for many years.      Today-accompanied by his wife.   Drinking the same. Has been drinking for many years. Does not think that will change.   History of vocal cord nodule seen during EGD. Patient was to see  Dr Aponte.Patient indicates did not make appt.     Chronic knee and back pain.   Home BP readings ~120/90.   Has appt with neurologist in September.     HPI:  Oswaldotrini Aguilar 63 y.o.   male hospital follow.     Patient Active Problem List   Diagnosis    Arthritis of knee    Bilateral knee pain    Colon cancer (Multi)    Elevated liver enzymes    Fatigue    HTN (hypertension)    Hypertriglyceridemia    Obesity (BMI 30-39.9)    Peripheral neuropathy    Status post partial colectomy    Shoulder pain    Tremor    History of back surgery    Alcohol dependence, daily use (Multi)    Gout    Spinal stenosis of lumbar region    S/P lumbar fusion    Primary osteoarthritis of both knees    Postlaminectomy syndrome of lumbar region    Gastroesophageal reflux disease without esophagitis    Anemia       Social History     Social History Narrative    . No children    Disabled from back issues. Previously [employer]    +drinks beer daily     Nonsmoker       Blood pressure 140/90, pulse 100, temperature 36.7 °C (98 °F), height 1.93 m (6' 4\"), weight 120 kg (265 lb 3.2 oz), SpO2 97%.  Body mass index is 32.28 kg/m².    General: NAD. Alert.   HEENT: Normocephalic atraumatic.    Eyes: no scleral " icterus. Extraocular movements intact.  Pupils equal round and reactive to light.  Ears: TM intact.  No cerumen. Hearing grossly intact.   Throat: No exudate  Neck:  Supple. No thyroid goiter.  Lymph nodes: No cervical or clavicular adenopathy  Cardiovascular: Regular rate rhythm S1-S2 normal no murmur. No carotid bruit.   Lungs: Clear to Auscultation without wheezing, rales, or rhonchi, Breath sounds symmetric. No use of accessory muscles  Abdomen:  Normoactive bowel sounds, soft, non-tender.   Extremities: No pretibial edema  Neuro: no facial asymmetry. Strength upper and lower extremities 5/5.  No tremor. Babinski negative  Skin: skin erythematous face, upper chest.   Vascular: DP pulses intact.     Labs 5/2024 cbc, bmp, Mg, lipid, TSH, PSA, uric acid, PTT  Wbc low 3.6 hg 15.9 platelet 244  Na 137 K 4.4 Cr 0.73 glucose 95   Mg low 1.7  162/79/75/63  TSH 1.422  PSA 1.0  uric acid elevated 8.4  PTT 28 PT 9.8 normal, INR 0.88  folic acid 9.4 normal.     AWV  Living will: advised  Cognition intact  Hepatitis C screen ordered. HIV screen ordered.   PSA 5/2024  Colonoscopy 2024 (for bleed)  Depression screen PHQ 2 score zero  Nonsmoker-does not meet criteria for screening CT or US to screen for AAA    1. Other specified polyneuropathies  Not ready to cut back/quit etoh  He indicates taking folic acid and b complex    2. Primary hypertension  Taking hydrochlorothiazide few days a week. Advised to stop  - amLODIPine-benazepriL (Lotrel) 5-20 mg capsule; Take 1 capsule by mouth 2 times a day.  Dispense: 180 capsule; Refill: 3  - metoprolol succinate XL (Toprol-XL) 50 mg 24 hr tablet; Take 1 tablet (50 mg) by mouth once daily. Do not crush or chew.  Dispense: 90 tablet; Refill: 3    3. Gastroesophageal reflux disease without esophagitis  - pantoprazole (ProtoNix) 40 mg EC tablet; Take 1 tablet (40 mg) by mouth once daily in the morning. Take before meals.  Dispense: 90 tablet; Refill: 3    4. Nodules of vocal cords  Advised  to see ENT  - Referral to ENT; Future    5. Screening for HIV (human immunodeficiency virus)  - HIV 1/2 Antigen/Antibody Screen with Reflex to Confirmation; Future    6. Need for hepatitis C screening test  - Hepatitis C antibody; Future    Discussed etoh use and long term potential consequences.   Immunization History   Administered Date(s) Administered    Flu vaccine (IIV4), preservative free *Check age/dose* 10/11/2016, 09/26/2018, 11/08/2022    Flu vaccine, quadrivalent, no egg protein, age 6 month or greater (FLUCELVAX) 01/06/2022    Flu vaccine, trivalent, preservative free, age 6 months and greater (Fluarix/Fluzone/Flulaval) 10/29/2015, 12/05/2017    Influenza, injectable, quadrivalent 11/08/2022    Influenza, seasonal, injectable 05/17/2016    Moderna SARS-CoV-2 Vaccination 03/17/2021, 04/14/2021, 01/06/2022    Tdap vaccine, age 7 year and older (BOOSTRIX, ADACEL) 01/03/2011, 05/22/2015       Current Outpatient Medications on File Prior to Visit   Medication Sig Dispense Refill    allopurinol (Zyloprim) 300 mg tablet Take 1 tablet (300 mg) by mouth once daily as needed.      amLODIPine-benazepriL (Lotrel) 5-20 mg capsule TAKE 1 CAPSULE BY MOUTH TWICE A DAY 60 capsule 0    cholecalciferol (Vitamin D3) 50 MCG (2000 UT) tablet Take 1 tablet (50 mcg) by mouth once daily.      hydroCHLOROthiazide (HYDRODiuril) 25 mg tablet TAKE 1 TABLET BY MOUTH EVERY DAY 90 tablet 0    metoprolol succinate XL (Toprol-XL) 50 mg 24 hr tablet Take 1 tablet (50 mg) by mouth once daily. Do not crush or chew. 90 tablet 3    pantoprazole (ProtoNix) 40 mg EC tablet Take 1 tablet (40 mg) by mouth 2 times a day.       No current facility-administered medications on file prior to visit.         Kassy Salomon MD

## 2024-08-24 LAB
APTT PPP: 26 SECONDS (ref 27–38)
INR PPP: 0.9 (ref 0.9–1.1)
PROTHROMBIN TIME: 9.9 SECONDS (ref 9.8–12.8)

## 2024-08-26 DIAGNOSIS — D50.9 IRON DEFICIENCY ANEMIA, UNSPECIFIED IRON DEFICIENCY ANEMIA TYPE: Primary | ICD-10-CM

## 2024-08-26 RX ORDER — AMLODIPINE AND BENAZEPRIL HYDROCHLORIDE 5; 20 MG/1; MG/1
1 CAPSULE ORAL 2 TIMES DAILY
Qty: 180 CAPSULE | Refills: 3 | OUTPATIENT
Start: 2024-08-26

## 2024-08-27 ENCOUNTER — TELEPHONE (OUTPATIENT)
Dept: PRIMARY CARE | Facility: CLINIC | Age: 64
End: 2024-08-27
Payer: COMMERCIAL

## 2024-08-27 NOTE — TELEPHONE ENCOUNTER
"Received message from Mara \"Oswaldo saw Dr. Rodríguez this morning and he is recommending Propranolol in place of Metoprolol.  He said that he would leave this up to Dr. Salomon.\"  (Called office of Dr. Rodríguez and they will fax over OV note as soon as it is complete)  "

## 2024-08-28 DIAGNOSIS — R25.1 TREMOR: Primary | ICD-10-CM

## 2024-08-28 RX ORDER — PROPRANOLOL HYDROCHLORIDE 20 MG/1
20 TABLET ORAL 2 TIMES DAILY
Qty: 60 TABLET | Refills: 3 | Status: SHIPPED | OUTPATIENT
Start: 2024-08-28 | End: 2025-08-28

## 2024-08-28 NOTE — TELEPHONE ENCOUNTER
Attempted to contact pt, left message to return call to clinical.  Sent detailed message on My chart informing of message from provider

## 2024-09-17 ENCOUNTER — APPOINTMENT (OUTPATIENT)
Dept: NEUROLOGY | Facility: CLINIC | Age: 64
End: 2024-09-17
Payer: COMMERCIAL

## 2024-09-24 DIAGNOSIS — I10 PRIMARY HYPERTENSION: ICD-10-CM

## 2024-09-24 RX ORDER — AMLODIPINE AND BENAZEPRIL HYDROCHLORIDE 5; 20 MG/1; MG/1
1 CAPSULE ORAL 2 TIMES DAILY
Qty: 90 CAPSULE | Refills: 3 | Status: SHIPPED | OUTPATIENT
Start: 2024-09-24 | End: 2024-09-26 | Stop reason: WASHOUT

## 2024-09-25 ENCOUNTER — APPOINTMENT (OUTPATIENT)
Dept: OTOLARYNGOLOGY | Facility: CLINIC | Age: 64
End: 2024-09-25
Payer: COMMERCIAL

## 2024-09-25 VITALS
HEIGHT: 76 IN | WEIGHT: 263.8 LBS | SYSTOLIC BLOOD PRESSURE: 134 MMHG | BODY MASS INDEX: 32.12 KG/M2 | TEMPERATURE: 97.4 F | DIASTOLIC BLOOD PRESSURE: 86 MMHG

## 2024-09-25 DIAGNOSIS — J38.3 LESION OF FALSE VOCAL CORD: ICD-10-CM

## 2024-09-25 PROCEDURE — 3079F DIAST BP 80-89 MM HG: CPT | Performed by: OTOLARYNGOLOGY

## 2024-09-25 PROCEDURE — 1036F TOBACCO NON-USER: CPT | Performed by: OTOLARYNGOLOGY

## 2024-09-25 PROCEDURE — 3008F BODY MASS INDEX DOCD: CPT | Performed by: OTOLARYNGOLOGY

## 2024-09-25 PROCEDURE — 3075F SYST BP GE 130 - 139MM HG: CPT | Performed by: OTOLARYNGOLOGY

## 2024-09-25 PROCEDURE — 31575 DIAGNOSTIC LARYNGOSCOPY: CPT | Performed by: OTOLARYNGOLOGY

## 2024-09-25 PROCEDURE — 99204 OFFICE O/P NEW MOD 45 MIN: CPT | Performed by: OTOLARYNGOLOGY

## 2024-09-25 NOTE — PROGRESS NOTES
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  I would be happy to refer him to one of my laryngology colleagues for surgical removal.  The patient agrees to the above plan.      **This electronic medical record note was created with the use of voice recognition software.  Despite proofreading, typographical or grammatical errors may be present that could affect meaning of content **    Subjective   Patient ID:     Oswaldo Aguilar is a 64 y.o. male who presents to the office today with a lesion along his left vocal cord that was recently seen on EGD.  The patient states that he occasionally has some mild hoarseness.  He denies any postnasal drip or any allergy issues.  He denies severe reflux issues or heartburn.  He denies any pain in the throat.  He does drink alcohol on a daily basis.  He denies smoking.  No hemoptysis or hematemesis.  He denies choking on foods or liquids.    ROS:  A detailed 12 system review of systems is noted on the intake form has been reviewed with the patient with details noted in the HPI and scanned into the patient's medical record.    Objective     Past Medical History:   Diagnosis Date    Personal history of other diseases of the circulatory system 10/11/2022    History of hypertension    Personal history of other diseases of the musculoskeletal system and connective tissue     History of chronic back pain        Past Surgical History:   Procedure Laterality Date    OTHER SURGICAL HISTORY  11/08/2022    Large bowel biopsy    SPINE SURGERY          Allergies   Allergen Reactions    Penicillins Unknown          Current Outpatient Medications:     amLODIPine-benazepriL (Lotrel) 5-20 mg capsule, Take 1 capsule by mouth 2 times a day., Disp: 90 capsule, Rfl: 3    cholecalciferol  "(Vitamin D3) 50 MCG (2000 UT) tablet, Take 1 tablet (50 mcg) by mouth once daily., Disp: , Rfl:     pantoprazole (ProtoNix) 40 mg EC tablet, Take 1 tablet (40 mg) by mouth once daily in the morning. Take before meals., Disp: 90 tablet, Rfl: 3    propranolol (Inderal) 20 mg tablet, Take 1 tablet (20 mg) by mouth 2 times a day., Disp: 60 tablet, Rfl: 3    allopurinol (Zyloprim) 300 mg tablet, Take 1 tablet (300 mg) by mouth once daily as needed., Disp: , Rfl:      Tobacco Use: Low Risk  (9/25/2024)    Patient History     Smoking Tobacco Use: Never     Smokeless Tobacco Use: Never     Passive Exposure: Not on file        Alcohol Use: Alcohol Misuse (8/23/2024)    AUDIT-C     Frequency of Alcohol Consumption: 4 or more times a week     Average Number of Drinks: 7 to 9     Frequency of Binge Drinking: Weekly        Social History     Substance and Sexual Activity   Drug Use Not on file        Physical Exam:  Visit Vitals  /86   Temp 36.3 °C (97.4 °F) (Temporal)   Ht 1.93 m (6' 4\")   Wt 120 kg (263 lb 12.8 oz)   BMI 32.11 kg/m²   Smoking Status Never   BSA 2.54 m²      General: Patient is alert, oriented, cooperative in no apparent distress.  Head: Normocephalic, atraumatic.  Eyes: PERRL, EOMI, Conjunctiva is clear. No nystagmus.  Ears: Right Ear-- Pinna is normal.  External auditory canal is patent. Tympanic membrane is [intact, translucent and has good mobility with my pneumatic otoscope. No effusion].  Mastoid is nontender.  Left ear-- Pinna is normal.  External auditory canal is patent. Tympanic membrane is [intact, translucent and has good mobility with my pneumatic otoscope.  No effusion].  Mastoid is nontender.  Nose: Septum is mildly deviated to the left.  No septal perforation or lesions. No septal hematoma/ seroma.  No signs of bleeding.  Inferior turbinates are mildly swollen.   No evidence of intranasal polyps.  No infectious drainage.  Throat:  Floor of mouth is clear, no masses.  Tongue appears normal, " no lesions or masses. Gums, gingiva, buccal mucosa appear pink and moist, no lesions. Teeth are in fair repair.  No obvious dental infections.  Peritonsillar regions appear symmetric without swelling.  Hard and soft palate appear normal, no obvious cleft. Uvula is midline.  Oropharynx: No lesions. Retropharyngeal wall is flat.  No active postnasal drip.  Neck: Supple,  no lymphadenopathy.  No masses.  Salivary Glands: Symmetric bilaterally.  No palpable masses.  No evidence of acute infection or salivary stones  Neurologic: Cranial Nerves 2-12 are grossly intact without focal deficits. Cerebellar function testing is normal.     Results:   []    Procedure:   Pre Op Diagnosis: Vocal cord lesion seen on recent EGD-status of lesion  Post Op Diagnosis: Same  Procedure: Flexible Nasopharyngolaryngoscopy  Surgeon: Layo Gil DO  Assist: None  Anesthesia: Topical Lidocaine 4%/ 0.05% Afrin 1:1 mixture  EBL: None  Complications: None  Disposition: The patient tolerated the procedure well. There were no complications.    Procedure:  After informed consent was obtained with the risks, benefits, complications and alternatives explained to the patient/ guardian, the patient was sat up in the ENT chair and the nose was anesthetized and decongested with topical  4% lidocaine and 0.05% Afrin. After allowing this to take effect, the flexible nasopharyngoscope was advanced thru the nostrils and down to the larynx. The following areas were visualized:  The nasal passages, septum, nasopharynx, sinus ostia, base of tongue, epiglottis, true and false vocal folds, arytenoids, post cricoid region and subglottis were all examined and found to be normal except as follows:  Septum is relatively straight with a mild deviation to the left.  Ostiomeatal complexes are clear bilaterally.  No pus polyps or lesions.  Nasopharynx is clear.  No masses.  Base of tongue clear.  Epiglottis is normal.  True vocal cords are approximating equally  bilaterally.  Along the posterior one third of his left false vocal cord he does have a small exophytic lesion that almost appears viral/warty.   No ulceration.  No bleeding.  Subglottis is clear.  Piriform sinuses are clear.      The patient tolerated the procedure well and there were no complications.      Layo Gil, DO

## 2024-09-26 ENCOUNTER — TELEPHONE (OUTPATIENT)
Dept: OTOLARYNGOLOGY | Facility: HOSPITAL | Age: 64
End: 2024-09-26
Payer: COMMERCIAL

## 2024-09-26 DIAGNOSIS — I10 HYPERTENSION, UNSPECIFIED TYPE: Primary | ICD-10-CM

## 2024-09-26 RX ORDER — AMLODIPINE AND BENAZEPRIL HYDROCHLORIDE 10; 40 MG/1; MG/1
1 CAPSULE ORAL DAILY
Qty: 100 CAPSULE | Refills: 3 | Status: SHIPPED | OUTPATIENT
Start: 2024-09-26 | End: 2025-10-31

## 2024-09-27 RX ORDER — AMLODIPINE AND BENAZEPRIL HYDROCHLORIDE 5; 20 MG/1; MG/1
1 CAPSULE ORAL 2 TIMES DAILY
Qty: 90 CAPSULE | Refills: 3 | OUTPATIENT
Start: 2024-09-27

## 2024-10-25 ENCOUNTER — OFFICE VISIT (OUTPATIENT)
Dept: OTOLARYNGOLOGY | Facility: CLINIC | Age: 64
End: 2024-10-25
Payer: COMMERCIAL

## 2024-10-25 VITALS — WEIGHT: 268.1 LBS | BODY MASS INDEX: 32.63 KG/M2

## 2024-10-25 DIAGNOSIS — R13.10 DYSPHAGIA, UNSPECIFIED TYPE: ICD-10-CM

## 2024-10-25 DIAGNOSIS — K21.9 LARYNGOPHARYNGEAL REFLUX (LPR): ICD-10-CM

## 2024-10-25 DIAGNOSIS — J38.7: Primary | ICD-10-CM

## 2024-10-25 PROCEDURE — 99213 OFFICE O/P EST LOW 20 MIN: CPT

## 2024-10-25 PROCEDURE — 31575 DIAGNOSTIC LARYNGOSCOPY: CPT

## 2024-10-25 ASSESSMENT — PATIENT HEALTH QUESTIONNAIRE - PHQ9
2. FEELING DOWN, DEPRESSED OR HOPELESS: NOT AT ALL
SUM OF ALL RESPONSES TO PHQ9 QUESTIONS 1 AND 2: 0
1. LITTLE INTEREST OR PLEASURE IN DOING THINGS: NOT AT ALL

## 2024-10-27 NOTE — PROGRESS NOTES
Reason For Consult  Chief Complaint   Patient presents with    vocal cord nodule         HISTORY OF PRESENT ILLNESS:  Oswaldo Aguilar, who is a 64 y.o. male presenting for an initial visit for examination for lesion of left false vocal cord-posterior one third. Patient was last seen by Dr. Gil on 9.25.24 and left false cord lesion discovered. The patient reports increased effort and difficulty when swallowing with solids and liquids. Patient reports occasional hoarseness of voice. Patient denies symptoms of acid reflux. Patient denies smoking history.      Past Medical History  He has a past medical history of Personal history of other diseases of the circulatory system (10/11/2022) and Personal history of other diseases of the musculoskeletal system and connective tissue.  Surgical History  He has a past surgical history that includes Other surgical history (11/08/2022) and Spine surgery.     Social History  He reports that he has never smoked. He has never used smokeless tobacco. He reports current alcohol use of about 8.0 standard drinks of alcohol per week. No history on file for drug use.    Allergies  Penicillins    Review of Systems  All 10 systems were reviewed and negative except for above.      Physical Exam  CONSTITUTIONAL: Well developed, well nourished.    VOICE: normal in office  RESPIRATION: Breathing comfortably, no stridor.    NEURO: Alert and oriented x3, cranial nerves II-XII intact and symmetric bilaterally.    EARS: Normal external ears, external auditory canals, normal hearing to conversational voice.    NOSE: External nose midline, anterior rhinoscopy is normal with limited visualization to the anterior aspect of the interior turbinates. No lesions noted.     ORAL CAVITY/OROPHARYNX/LIPS: Normal mucous membranes, normal floor of mouth/tongue/OP, no masses or lesions are noted.    SKIN: Neck skin is inctactscar or injury.    PSYCH: Alert and oriented with appropriate mood and affect.         Last Recorded Vitals  Weight 122 kg (268 lb 1.6 oz).    Procedure  PROCEDURE NOTE:  Recommended flexible laryngoscopy/stroboscopy.  Risks, benefits,  and alternatives were explained.  They wish to proceed and provide verbal consent.     PROCEDURE:  Flexible Laryngoscopy, CPT 64842  Flexible laryngoscopy with stroboscopy, CPT 92310     POSTPROCEDURE DIAGNOSIS: granuloma tissue    INDICATIONS: Inability to tolerate mirror exam or abnormal findings on mirror, Flexible Laryngoscopy/Stroboscopy performed to assess one of the followin. Diagnosis of symptomatic disorder involving the voice, swallow, upper aerodigestive tract, including JIMBO disorders, or  2. Preoperative evaluation of vocal cord function for individuals undergoing surgery where the RLN or vagus nerves are at risk of injury, or  3. Further evaluation of abnormalities of the upper aerodigestive tract discovered by another modality, such as CT, MRI, bronchoscopy or EGD    Description of Procedure:    After adequate afrin and lidocaine spray, I advanced the endoscope.  Visualization of the nasopharynx, vallecula, posterior pharyngeal walls, pyriform, epiglottis and post cricoid areas was unremarkable.  The following laryngeal findings were noted:    vocal cord movement was good bilaterally  closure was complete  Mucosal wave was not assessed  Compression was increased AP and FVC  interarytenoid edema   lesions were granuloma tissue left vocal process  the subglottis was widely patent  Pharyngeal wall squeeze was normal        Procedure well tolerated.       ASSESSMENT AND PLAN:   This is an initial visit for assessment of mass/lesion on left posterior FVC with clinical findings notable for good movement and closure of bilateral vocal cords. Left posterior commissure granuloma tissue. Will review images with Dr. Wang and call patient with next steps regarding removal.     We discussed the treatment options to include, medical and surgical options.   We have decided to proceed as follows:   Will review scope exam with Dr. Wang and call patient with next steps for removal.   2.  Reviewed scope exam with Dr. Wang- posterior commissure granuloma. Will start on nexium 40 BID, Gaviscon after meals and follow up with Dr. Casas.   3. Will have patient complete MBSS and will call with results due to dysphagia.

## 2024-11-01 RX ORDER — ESOMEPRAZOLE MAGNESIUM 40 MG/1
40 CAPSULE, DELAYED RELEASE ORAL
Qty: 120 CAPSULE | Refills: 0 | Status: SHIPPED | OUTPATIENT
Start: 2024-11-01 | End: 2024-12-31

## 2024-11-01 RX ORDER — MAGNESIUM CARB/ALUMINUM HYDROX 105-160MG
2 TABLET,CHEWABLE ORAL
Qty: 180 TABLET | Refills: 2 | Status: SHIPPED | OUTPATIENT
Start: 2024-11-01 | End: 2024-12-01

## 2024-11-13 ENCOUNTER — APPOINTMENT (OUTPATIENT)
Dept: ORTHOPEDIC SURGERY | Facility: CLINIC | Age: 64
End: 2024-11-13
Payer: COMMERCIAL

## 2024-11-20 ENCOUNTER — HOSPITAL ENCOUNTER (OUTPATIENT)
Dept: RADIOLOGY | Facility: EXTERNAL LOCATION | Age: 64
Discharge: HOME | End: 2024-11-20

## 2024-11-20 ENCOUNTER — OFFICE VISIT (OUTPATIENT)
Dept: ORTHOPEDIC SURGERY | Facility: CLINIC | Age: 64
End: 2024-11-20
Payer: COMMERCIAL

## 2024-11-20 DIAGNOSIS — G89.29 CHRONIC PAIN OF BOTH KNEES: ICD-10-CM

## 2024-11-20 DIAGNOSIS — M25.561 CHRONIC PAIN OF BOTH KNEES: ICD-10-CM

## 2024-11-20 DIAGNOSIS — M25.562 CHRONIC PAIN OF BOTH KNEES: ICD-10-CM

## 2024-11-20 DIAGNOSIS — M17.0 PRIMARY OSTEOARTHRITIS OF BOTH KNEES: Primary | ICD-10-CM

## 2024-11-20 PROCEDURE — 99213 OFFICE O/P EST LOW 20 MIN: CPT | Performed by: FAMILY MEDICINE

## 2024-11-20 PROCEDURE — 20611 DRAIN/INJ JOINT/BURSA W/US: CPT | Mod: 50 | Performed by: FAMILY MEDICINE

## 2024-11-20 PROCEDURE — 2500000004 HC RX 250 GENERAL PHARMACY W/ HCPCS (ALT 636 FOR OP/ED): Performed by: FAMILY MEDICINE

## 2024-11-20 PROCEDURE — 1036F TOBACCO NON-USER: CPT | Performed by: FAMILY MEDICINE

## 2024-11-20 RX ORDER — TRIAMCINOLONE ACETONIDE 40 MG/ML
2.5 INJECTION, SUSPENSION INTRA-ARTICULAR; INTRAMUSCULAR
Status: COMPLETED | OUTPATIENT
Start: 2024-11-20 | End: 2024-11-20

## 2024-11-20 RX ORDER — LIDOCAINE HYDROCHLORIDE 20 MG/ML
2 INJECTION, SOLUTION INFILTRATION; PERINEURAL
Status: COMPLETED | OUTPATIENT
Start: 2024-11-20 | End: 2024-11-20

## 2024-11-20 NOTE — PROGRESS NOTES
History of Present Illness   Chief Complaint   Patient presents with    Left Knee - Follow-up     FUV BL KNEE INJS LAST DONE WITH MAL 8/21/23  SEEN AT [facility] W/ DR. WHITE 1/5/24 FOR BL KNEE INJ'S ALSO    Right Knee - Follow-up     FUV BL KNEE INJS LAST DONE WITH MAL 8/21/23  SEEN AT [facility] W/ DR. WHITE 1/5/24 FOR BL KNEE INJ'S ALSO       The patient is 64 y.o. male  here with a complaint of bilateral knee pain.  Patient has known osteoarthritis of bilateral knees, right greater than left.  He was last seen in our office by Dr. LoPresti a little over a year ago, at that time he was treated with bilateral knee joint injections with Kenalog which were of good benefit, there was some issues with his insurance and he did have to see a provider through [facility] in January 2024, had repeat injections done there which were of good benefit, lasting around 3 to 4 months, he has been dealing with pain since, thought he would be evaluated again today, hopeful for repeat injections.  He says pain is more prominent in the right knee compared to the left, somewhat generalized, slightly more prominent over the lateral aspect of both knees, symptoms are exacerbated by walking, standing, stairs, he denies any significant swelling or instability.  He takes over-the-counter medications as needed for pain.  Patient is disabled secondary to chronic back pain.    Past Medical History:   Diagnosis Date    Personal history of other diseases of the circulatory system 10/11/2022    History of hypertension    Personal history of other diseases of the musculoskeletal system and connective tissue     History of chronic back pain       Medication Documentation Review Audit       Reviewed by Modesto Monroy MA (Medical Assistant) on 11/20/24 at 1426      Medication Order Taking? Sig Documenting Provider Last Dose Status   allopurinol (Zyloprim) 300 mg tablet [id number] No Take 1 tablet (300 mg) by mouth once daily as needed.   Patient not  taking: Reported on 10/25/2024    Historical Provider, MD Taking Active   aluminum hydrox-magnesium carb (Gaviscon Extra Strength) 160-105 mg tablet,chewable [id number]  Chew 2 tablets 3 times a day after meals. After meals IVETTE Aviles-CNP  Active   amLODIPine-benazepriL (LotreL) 10-40 mg capsule [id number]  Take 1 capsule by mouth once daily. Kassy Chavez MD  Active   cholecalciferol (Vitamin D3) 50 MCG (2000 UT) tablet [id number] No Take 1 tablet (50 mcg) by mouth once daily. Historical Provider, MD Taking Active   esomeprazole (NexIUM) 40 mg DR capsule [id number]  Take 1 capsule (40 mg) by mouth 2 times a day before meals. Do not open capsule. IVETTE Aviles-CNP  Active   pantoprazole (ProtoNix) 40 mg EC tablet [id number] No Take 1 tablet (40 mg) by mouth once daily in the morning. Take before meals. Kassy Chavez MD Taking Active   propranolol (Inderal) 20 mg tablet [id number] No Take 1 tablet (20 mg) by mouth 2 times a day. Kassy Chavez MD Taking Active                    Allergies   Allergen Reactions    Penicillins Unknown       Social History     Socioeconomic History    Marital status:      Spouse name: Not on file    Number of children: Not on file    Years of education: Not on file    Highest education level: Not on file   Occupational History    Not on file   Tobacco Use    Smoking status: Never    Smokeless tobacco: Never   Substance and Sexual Activity    Alcohol use: Yes     Alcohol/week: 8.0 standard drinks of alcohol     Types: 8 Cans of beer per week    Drug use: Not on file    Sexual activity: Not on file   Other Topics Concern    Not on file   Social History Narrative    . No children    Disabled from back issues. Previously [employer]    +drinks beer daily     Social Drivers of Health     Financial Resource Strain: Not on File (8/26/2019)    Received from [facility]    Financial Resource Strain     Financial Resource Strain: 0   Food Insecurity:  Not on File (2019)    [facility] from [facility]    Food Insecurity     Food: 0   Transportation Needs: Not on File (2019)    Received from [facility]    Transportation Needs     Transportation: 0   Physical Activity: Not on File (2019)    Received from [facility]    Physical Activity     Physical Activity: 0   Stress: Not on File (2019)    Received from [facility]    Stress     Stress: 0   Social Connections: Not on File (2019)    Received from [facility]    Social Connections     Social Connections and Isolation: 0   Intimate Partner Violence: Not on file   Housing Stability: Not on File (2019)    Received from [facility]    Housing Stability     Housin       Past Surgical History:   Procedure Laterality Date    OTHER SURGICAL HISTORY  2022    Large bowel biopsy    SPINE SURGERY            Review of Systems   GENERAL: Negative  GI: Negative  MUSCULOSKELETAL: See HPI  SKIN: Negative  NEURO:  Negative     Physical Exam:    General/Constitutional: well appearing, no distress, appears stated age  HEENT: sclera clear  Respiratory: non labored breathing  Vascular: No edema, swelling or tenderness, except as noted in detailed exam.  Integumentary: No impressive skin lesions present, except as noted in detailed exam.  Neurological:  Alert and oriented   Psychological:  Normal mood and affect.  Musculoskeletal: Normal, except as noted in detailed exam and in HPI.  Normal gait, unassisted    Right knee: Normal appearance, no swelling, no skin changes.  No joint effusion.  There are some lateral joint line tenderness.  Range of motion from 5 to 110 degrees with crepitus.  No motor deficits are present.  No gross ligamentous laxity    Left knee: Normal appearance, no swelling, no skin changes, no joint effusion.  Mild medial and lateral joint line tenderness to palpation.  Range of motion from 5 to 110 degrees, no motor deficits, no ligamentous laxity.       Imaging: No new  imaging today    L Inj/Asp: bilateral knee on 11/20/2024 2:36 PM  Indications: pain  Details: 22 G needle, ultrasound-guided superolateral approach  Medications (Right): 2.5 mg triamcinolone acetonide 40 mg/mL; 2 mL lidocaine 20 mg/mL (2 %)  Medications (Left): 2.5 mg triamcinolone acetonide 40 mg/mL; 2 mL lidocaine 20 mg/mL (2 %)  Outcome: tolerated well, no immediate complications  Procedure, treatment alternatives, risks and benefits explained, specific risks discussed. Consent was given by the patient. Immediately prior to procedure a time out was called to verify the correct patient, procedure, equipment, support staff and site/side marked as required. Patient was prepped and draped in the usual sterile fashion.               Assessment   1. Primary osteoarthritis of both knees        2. Chronic pain of both knees  Point of Care Ultrasound            Plan: Discussed diagnosis, reviewed x-rays, treatment with patient.  He was interested in more conservative management.  We did proceed with bilateral knee joint injection with Kenalog under ultrasound guidance, see procedure note for details.  We discussed potential need for more definitive management with knee replacement surgery in the future.  Patient voiced understanding.  He will plan to follow-up as symptoms dictate.

## 2024-11-29 DIAGNOSIS — K21.9 LARYNGOPHARYNGEAL REFLUX (LPR): ICD-10-CM

## 2024-12-02 RX ORDER — ESOMEPRAZOLE MAGNESIUM 40 MG/1
40 CAPSULE, DELAYED RELEASE ORAL
Qty: 180 CAPSULE | Refills: 1 | Status: SHIPPED | OUTPATIENT
Start: 2024-12-02 | End: 2025-08-29

## 2024-12-04 ENCOUNTER — HOSPITAL ENCOUNTER (OUTPATIENT)
Dept: RADIOLOGY | Facility: CLINIC | Age: 64
Discharge: HOME | End: 2024-12-04
Payer: COMMERCIAL

## 2024-12-04 ENCOUNTER — OFFICE VISIT (OUTPATIENT)
Dept: ORTHOPEDIC SURGERY | Facility: CLINIC | Age: 64
End: 2024-12-04
Payer: COMMERCIAL

## 2024-12-04 ENCOUNTER — HOSPITAL ENCOUNTER (OUTPATIENT)
Dept: RADIOLOGY | Facility: EXTERNAL LOCATION | Age: 64
Discharge: HOME | End: 2024-12-04

## 2024-12-04 DIAGNOSIS — M25.511 ACUTE PAIN OF RIGHT SHOULDER: ICD-10-CM

## 2024-12-04 DIAGNOSIS — M19.011 OSTEOARTHRITIS OF RIGHT GLENOHUMERAL JOINT: Primary | ICD-10-CM

## 2024-12-04 PROCEDURE — 99213 OFFICE O/P EST LOW 20 MIN: CPT | Performed by: FAMILY MEDICINE

## 2024-12-04 PROCEDURE — 2500000004 HC RX 250 GENERAL PHARMACY W/ HCPCS (ALT 636 FOR OP/ED): Performed by: FAMILY MEDICINE

## 2024-12-04 PROCEDURE — 73030 X-RAY EXAM OF SHOULDER: CPT | Mod: RIGHT SIDE | Performed by: RADIOLOGY

## 2024-12-04 PROCEDURE — 99213 OFFICE O/P EST LOW 20 MIN: CPT | Mod: 25 | Performed by: FAMILY MEDICINE

## 2024-12-04 PROCEDURE — 20611 DRAIN/INJ JOINT/BURSA W/US: CPT | Mod: RT | Performed by: FAMILY MEDICINE

## 2024-12-04 PROCEDURE — 73030 X-RAY EXAM OF SHOULDER: CPT | Mod: RT

## 2024-12-04 PROCEDURE — 1036F TOBACCO NON-USER: CPT | Performed by: FAMILY MEDICINE

## 2024-12-04 RX ORDER — LIDOCAINE HYDROCHLORIDE 20 MG/ML
2 INJECTION, SOLUTION INFILTRATION; PERINEURAL
Status: COMPLETED | OUTPATIENT
Start: 2024-12-04 | End: 2024-12-04

## 2024-12-04 RX ORDER — TRIAMCINOLONE ACETONIDE 40 MG/ML
40 INJECTION, SUSPENSION INTRA-ARTICULAR; INTRAMUSCULAR
Status: COMPLETED | OUTPATIENT
Start: 2024-12-04 | End: 2024-12-04

## 2024-12-04 NOTE — PROGRESS NOTES
History of Present Illness   Chief Complaint   Patient presents with    Right Shoulder - Pain       The patient is 64 y.o. male  here with a complaint of right shoulder pain.  Chronic issue over the past several years, he has some generalized shoulder discomfort with limited range of motion, he admits to some weakness in the shoulder, get some radiation of pain into the posterior shoulder and neck at times.  He does have pain with sleeping at night.  He admits to some popping and clicking of his shoulder.  He denies any shoulder injuries in the past.  He takes Tylenol for pain control.  Left shoulder is doing okay.    Past Medical History:   Diagnosis Date    Personal history of other diseases of the circulatory system 10/11/2022    History of hypertension    Personal history of other diseases of the musculoskeletal system and connective tissue     History of chronic back pain       Medication Documentation Review Audit       Reviewed by Fredo Simon MD (Physician) on 11/20/24 at 1441      Medication Order Taking? Sig Documenting Provider Last Dose Status   allopurinol (Zyloprim) 300 mg tablet [id number] No Take 1 tablet (300 mg) by mouth once daily as needed.   Patient not taking: Reported on 10/25/2024    Historical Provider, MD Taking Active   aluminum hydrox-magnesium carb (Gaviscon Extra Strength) 160-105 mg tablet,chewable [id number]  Chew 2 tablets 3 times a day after meals. After meals TYESHA Aviles  Active   amLODIPine-benazepriL (LotreL) 10-40 mg capsule [id number]  Take 1 capsule by mouth once daily. Kassy Chavez MD  Active   cholecalciferol (Vitamin D3) 50 MCG (2000 UT) tablet [id number] No Take 1 tablet (50 mcg) by mouth once daily. Historical Provider, MD Taking Active   esomeprazole (NexIUM) 40 mg DR capsule [id number]  Take 1 capsule (40 mg) by mouth 2 times a day before meals. Do not open capsule. TYESHA Aviles  Active   pantoprazole (ProtoNix) 40 mg EC tablet [id number] No Take  1 tablet (40 mg) by mouth once daily in the morning. Take before meals. Kassy Chavez MD Taking Active   propranolol (Inderal) 20 mg tablet [id number] No Take 1 tablet (20 mg) by mouth 2 times a day. Kassy Chavez MD Taking Active                    Allergies   Allergen Reactions    Penicillins Unknown       Social History     Socioeconomic History    Marital status:      Spouse name: Not on file    Number of children: Not on file    Years of education: Not on file    Highest education level: Not on file   Occupational History    Not on file   Tobacco Use    Smoking status: Never    Smokeless tobacco: Never   Substance and Sexual Activity    Alcohol use: Yes     Alcohol/week: 8.0 standard drinks of alcohol     Types: 8 Cans of beer per week    Drug use: Not on file    Sexual activity: Not on file   Other Topics Concern    Not on file   Social History Narrative    . No children    Disabled from back issues. Previously [employer]    +drinks beer daily     Social Drivers of Health     Financial Resource Strain: Not on File (2019)    Received from [facility]    Financial Resource Strain     Financial Resource Strain: 0   Food Insecurity: Not on File (2019)    Received from [facility]    Food Insecurity     Food: 0   Transportation Needs: Not on File (2019)    Received from [facility]    Transportation Needs     Transportation: 0   Physical Activity: Not on File (2019)    Received from [facility]    Physical Activity     Physical Activity: 0   Stress: Not on File (2019)    Received from [facility]    Stress     Stress: 0   Social Connections: Not on File (2019)    Received from [facility]    Social Connections     Social Connections and Isolation: 0   Intimate Partner Violence: Not on file   Housing Stability: Not on File (2019)    Received from [facility]    Housing Stability     Housin       Past Surgical History:   Procedure  Laterality Date    OTHER SURGICAL HISTORY  11/08/2022    Large bowel biopsy    SPINE SURGERY            Review of Systems   GENERAL: Negative  GI: Negative  MUSCULOSKELETAL: See HPI  SKIN: Negative  NEURO:  Negative     Physical Exam:    General/Constitutional: well appearing, no distress, appears stated age  HEENT: sclera clear  Respiratory: non labored breathing  Vascular: No edema, swelling or tenderness, except as noted in detailed exam.  Integumentary: No impressive skin lesions present, except as noted in detailed exam.  Neurological:  Alert and oriented   Psychological:  Normal mood and affect.  Musculoskeletal: Normal, except as noted in detailed exam and in HPI    Right shoulder: Normal appearance, no skin changes.  There is some generalized tenderness palpation at the shoulder.  He has limited range of motion actively with abduction and forward flexion to around 90 degrees, internal rotation lumbar spine.  Passively I can get to around 100 degrees of forward flexion and abduction.  There is crepitus.  There is some pain and in mild weakness, 4 out of 5 with resisted abduction and external rotation       Imaging: X-rays of right shoulder obtained today and independently reviewed, there is advanced degenerative changes of the glenohumeral joint with notable joint space narrowing, there is large inferior osteophyte of the humeral head    L Inj/Asp: R glenohumeral on 12/4/2024 3:14 PM  Indications: pain  Details: 22 G needle, ultrasound-guided posterior approach  Medications: 40 mg triamcinolone acetonide 40 mg/mL; 2 mL lidocaine 20 mg/mL (2 %)  Outcome: tolerated well, no immediate complications  Procedure, treatment alternatives, risks and benefits explained, specific risks discussed. Consent was given by the patient. Immediately prior to procedure a time out was called to verify the correct patient, procedure, equipment, support staff and site/side marked as required. Patient was prepped and draped in the  usual sterile fashion.               Assessment   1. Osteoarthritis of right glenohumeral joint        2. Acute pain of right shoulder  XR shoulder right 2+ views    Point of Care Ultrasound            Plan: Right shoulder pain, limited mobility secondary to advanced glenohumeral joint osteoarthritis.  We discussed treatment options including conservative management with physical therapy, intra-articular corticosteroid injections as well as more definitive treatment with shoulder replacement surgery.  We did proceed with ultrasound-guided glenohumeral joint injection with Kenalog today, patient tolerated without issue, he was instructed on some home exercises that he can do.  Could consider repeat injection if good response to today's injection.  He will plan to follow-up as symptoms dictate.

## 2024-12-10 ENCOUNTER — HOSPITAL ENCOUNTER (OUTPATIENT)
Dept: RADIOLOGY | Facility: HOSPITAL | Age: 64
Discharge: HOME | End: 2024-12-10
Payer: COMMERCIAL

## 2024-12-10 DIAGNOSIS — R13.10 DYSPHAGIA, UNSPECIFIED TYPE: ICD-10-CM

## 2024-12-10 PROCEDURE — 92526 ORAL FUNCTION THERAPY: CPT | Mod: GN | Performed by: SPEECH-LANGUAGE PATHOLOGIST

## 2024-12-10 PROCEDURE — 2500000005 HC RX 250 GENERAL PHARMACY W/O HCPCS

## 2024-12-10 PROCEDURE — 74230 X-RAY XM SWLNG FUNCJ C+: CPT

## 2024-12-10 PROCEDURE — 92611 MOTION FLUOROSCOPY/SWALLOW: CPT | Mod: GN | Performed by: SPEECH-LANGUAGE PATHOLOGIST

## 2024-12-10 PROCEDURE — 74230 X-RAY XM SWLNG FUNCJ C+: CPT | Performed by: STUDENT IN AN ORGANIZED HEALTH CARE EDUCATION/TRAINING PROGRAM
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
## 2024-12-17 ENCOUNTER — OFFICE VISIT (OUTPATIENT)
Dept: OTOLARYNGOLOGY | Facility: CLINIC | Age: 64
End: 2024-12-17
Payer: COMMERCIAL

## 2024-12-17 VITALS — TEMPERATURE: 96.3 F | HEIGHT: 76 IN | BODY MASS INDEX: 32.5 KG/M2 | WEIGHT: 266.9 LBS

## 2024-12-17 DIAGNOSIS — Z01.818 PRE-OPERATIVE CLEARANCE: ICD-10-CM

## 2024-12-17 DIAGNOSIS — R13.10 DYSPHAGIA, UNSPECIFIED TYPE: Primary | ICD-10-CM

## 2024-12-17 DIAGNOSIS — J38.3 LESION OF FALSE VOCAL CORD: ICD-10-CM

## 2024-12-17 DIAGNOSIS — J38.7: ICD-10-CM

## 2024-12-17 PROCEDURE — 3008F BODY MASS INDEX DOCD: CPT | Performed by: OTOLARYNGOLOGY

## 2024-12-17 PROCEDURE — 1036F TOBACCO NON-USER: CPT | Performed by: OTOLARYNGOLOGY

## 2024-12-17 PROCEDURE — 99215 OFFICE O/P EST HI 40 MIN: CPT | Performed by: OTOLARYNGOLOGY

## 2024-12-17 PROCEDURE — 99215 OFFICE O/P EST HI 40 MIN: CPT | Mod: 25 | Performed by: OTOLARYNGOLOGY

## 2024-12-17 ASSESSMENT — PAIN SCALES - GENERAL: PAINLEVEL_OUTOF10: 0-NO PAIN

## 2024-12-17 NOTE — PROGRESS NOTES
Patient: Oswaldo Aguilar   MRN: [id number] YOB: 1960   Sex: male Age: 64 y.o.  Date of Service: 2024       ASSESSMENT AND PLAN  I discussed the findings with Oswaldo Aguilar and have recommended the followin. Left posterior glottic lesion, in the location of a typical granuloma however there are some papillomatous changes that are atypical. Recommend OR for MDL, excision, steroid injection, possible laser. He is interested in proceeding.  - Risks, benefits, and alternatives discussed with the patient, including but not limited to bleeding, infection, pain, need for repeat procedure, no improvement in symptoms, dental damage, injury to surrounding structures, and unanticipated mortality. The patient expressed understanding and agrees to proceed.     2. 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 Risks, benefits, and alternatives of esophagoscopy and dilation discussed with the patient, including but not limited to bleeding, infection, pain, need for repeat procedure, no improvement in symptoms, and rarely, esophageal perforation. The patient expressed understanding and agrees to proceed.       CHIEF COMPLAINT  Chief Complaint   Patient presents with    lesion        HISTORY OF PRESENT ILLNESS  Oswaldo Aguilar is a 64 y.o. male referred by Jcarlos Alcantara APRN-C* for evaluation of dysphagia and laryngeal lesion.  The patient previously saw Dr. Gil and noted to have a left posterior glottic granuloma.    He has had several intubations in the past for multiple surgeries.   He also reports his swallowing has been difficulty for about 6 months. Not every meal but seems like it's getting more frequent. Notes some associated voice issues, voice is more quiet.    The dysphagia history includes:      Dysphagia for solids               yes    Dysphagia for  liquids              no    Dysphagia for pills                  occ  Associated weight loss            no    Recent pneumonia/bronchitis  no  GERD/Acid reflux   yes    PMH: HTN, colon cancer s/p surgery  SH: never smoking,12 drinks daily    ADDITIONAL HISTORY  Past Medical History  He has a past medical history of Personal history of other diseases of the circulatory system (10/11/2022) and Personal history of other diseases of the musculoskeletal system and connective tissue. Surgical History  He has a past surgical history that includes Other surgical history (2022) and Spine surgery.   Social History  He reports that he has never smoked. He has never used smokeless tobacco. He reports current alcohol use of about 8.0 standard drinks of alcohol per week. No history on file for drug use. Allergies  Penicillins     Family History  Family History   Problem Relation Name Age of Onset    Psoriasis Mother           55    Liver disease Mother      Coronary artery disease Father           67    Emphysema Father      Gout Father's Brother          REVIEW OF SYSTEMS  All 10 systems were reviewed and negative except for above.      PHYSICAL EXAM  ENT Physical Exam   GENERAL: Well-nourished and developed, alert and appropriate, no distress, voice [id number]  RESPIRATORY: Breathing quietly, no stridor  HEAD: Normocephalic atraumatic  FACE: Symmetric, no masses or lesions  EYES:  Pupils reactive, sclera clear, external ocular muscles intact, no nystagmus.    EARS:  Pinnae normal. External auditory canals clear and tympanic membranes intact.  NOSE:  No anterior lesions, masses or polyps.  ORAL CAVITY/OROPHARYNX:  Buccal mucosa is moist without lesions or masses, tongue midline and palate elevates symmetrically. Tongue mobility intact. Mild tongue asymmetry but soft  NECK:  Soft. There is no lymphadenopathy or thyromegaly.    NEUROLOGIC:  Cranial nerves II-XII grossly intact.       Last Recorded  "Vitals  Temperature 35.7 °C (96.3 °F), height 1.93 m (6' 4\"), weight 121 kg (266 lb 14.4 oz).    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  The nasal cavity was topically decongested anesthetized.  The distal chip video laryngoscope was passed through the nasal cavity.  The nasal cavity and nasopharynx were within normal limits except noted below. The following findings on laryngoscopy were noted:     Tongue Base: no masses or lesions   Vocal Fold Mobility               Right VF: mobile               Left VF: mobile   TVF Appearance               Edema/Erythema: none               Lesions/vibratory margin irregularities: left posterior glottic lesion that has features consistent with a granuloma however there is also an area of additional irregularity/papillomatous changes   Muscle Tension Patterns:  minimal   Other Findings: redundant pharyngeal tissue, circumferential pharyngeal collapse    The patient tolerated the procedure well.        ----------------------------------------------------------------------  Delmis Casas MD, MAEd    [facility]  Department of Otolaryngology - Head and Neck Surgery  [facility] " [facility]    The total time I spent in care of this patient today (excluding time spent on other billable services) is as follows:    Time Spent  Prep time on day of patient encounter: 10 minutes  Time spent directly with patient, family or caregiver: 20 minutes  Additional Time Spent on Patient Care Activities: 5 minutes  Documentation Time: 10 minutes  Other Time Spent: 0 minutes  Total: 45 minutes

## 2024-12-18 ENCOUNTER — APPOINTMENT (OUTPATIENT)
Dept: PRIMARY CARE | Facility: CLINIC | Age: 64
End: 2024-12-18
Payer: COMMERCIAL

## 2024-12-18 VITALS
TEMPERATURE: 97.5 F | HEIGHT: 76 IN | WEIGHT: 269.6 LBS | BODY MASS INDEX: 32.83 KG/M2 | DIASTOLIC BLOOD PRESSURE: 80 MMHG | HEART RATE: 64 BPM | OXYGEN SATURATION: 99 % | SYSTOLIC BLOOD PRESSURE: 120 MMHG

## 2024-12-18 DIAGNOSIS — R25.1 TREMOR: ICD-10-CM

## 2024-12-18 DIAGNOSIS — R74.8 ELEVATED LIVER ENZYMES: ICD-10-CM

## 2024-12-18 DIAGNOSIS — F10.20 ALCOHOL DEPENDENCE, DAILY USE (MULTI): Primary | ICD-10-CM

## 2024-12-18 DIAGNOSIS — D64.9 ANEMIA, UNSPECIFIED TYPE: ICD-10-CM

## 2024-12-18 DIAGNOSIS — I10 HYPERTENSION, UNSPECIFIED TYPE: ICD-10-CM

## 2024-12-18 PROCEDURE — 3074F SYST BP LT 130 MM HG: CPT | Performed by: INTERNAL MEDICINE

## 2024-12-18 PROCEDURE — 90656 IIV3 VACC NO PRSV 0.5 ML IM: CPT | Performed by: INTERNAL MEDICINE

## 2024-12-18 PROCEDURE — 1036F TOBACCO NON-USER: CPT | Performed by: INTERNAL MEDICINE

## 2024-12-18 PROCEDURE — 99214 OFFICE O/P EST MOD 30 MIN: CPT | Performed by: INTERNAL MEDICINE

## 2024-12-18 PROCEDURE — 3079F DIAST BP 80-89 MM HG: CPT | Performed by: INTERNAL MEDICINE

## 2024-12-18 PROCEDURE — 3008F BODY MASS INDEX DOCD: CPT | Performed by: INTERNAL MEDICINE

## 2024-12-18 PROCEDURE — G0008 ADMIN INFLUENZA VIRUS VAC: HCPCS | Performed by: INTERNAL MEDICINE

## 2024-12-18 RX ORDER — PROPRANOLOL HYDROCHLORIDE 20 MG/1
20 TABLET ORAL 2 TIMES DAILY
Qty: 180 TABLET | Refills: 3 | Status: SHIPPED | OUTPATIENT
Start: 2024-12-18 | End: 2025-12-18

## 2024-12-18 RX ORDER — AMLODIPINE AND BENAZEPRIL HYDROCHLORIDE 10; 40 MG/1; MG/1
1 CAPSULE ORAL DAILY
Qty: 100 CAPSULE | Refills: 3 | Status: SHIPPED | OUTPATIENT
Start: 2024-12-18 | End: 2026-01-22

## 2024-12-18 RX ORDER — METOPROLOL SUCCINATE 50 MG/1
50 TABLET, EXTENDED RELEASE ORAL DAILY
COMMUNITY
Start: 2024-11-21 | End: 2024-12-18 | Stop reason: WASHOUT

## 2024-12-18 ASSESSMENT — PAIN SCALES - GENERAL: PAINLEVEL_OUTOF10: 4

## 2024-12-18 ASSESSMENT — PATIENT HEALTH QUESTIONNAIRE - PHQ9
2. FEELING DOWN, DEPRESSED OR HOPELESS: NOT AT ALL
SUM OF ALL RESPONSES TO PHQ9 QUESTIONS 1 & 2: 0
1. LITTLE INTEREST OR PLEASURE IN DOING THINGS: NOT AT ALL

## 2024-12-18 NOTE — PROGRESS NOTES
Chief Complaint   Patient presents with    Follow-up     Pt here for 4 mo FUV and refills   Accompanied by his wife  last visit 08/2024   previous admission  lower GI bleed.  EGD and colonoscopy. 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  Colonoscopy showed diverticulosis, partial cecectomy, partial left hemicolectomy.  EGD showed nodule left vocal cord and area with appearance of Ambrose's.  drinks about 6-12 beers a day.  He has done this for many years.    history of vocal cord nodule seen during EGD. Patient was to see  Dr Aponte.Patient indicates did not make appt.   Chronic knee and back pain.       Since last visit saw neurologist Dr. Rodríguez diagnosed with essential tremor.  Recommended considering changing metoprolol to propranolol.  Patient indicates he is switched to propranolol and had significant improvement in tremor and just a day  ENT saw a nurse practitioner Maricruz and Dr. Casas.  Lower endoscopy.  Left posterior glottic lesion.  Chest x-ray ordered.  His surgery scheduled for January 31, 2025  Ortho Dr. Simon right shoulder injection.  He indicates the injection did help his shoulder pain    HPI:  Oswaldo Aguilar 64 y.o.   male   Patient Active Problem List   Diagnosis    Arthritis of knee    Bilateral knee pain    Colon cancer (Multi)    Elevated liver enzymes    Fatigue    HTN (hypertension)    Hypertriglyceridemia    Obesity (BMI 30-39.9)    Peripheral neuropathy    Status post partial colectomy    Shoulder pain    Tremor    History of back surgery    Alcohol dependence, daily use (Multi)    Gout    Spinal stenosis of lumbar region    S/P lumbar fusion    Primary osteoarthritis of both knees    Postlaminectomy syndrome of lumbar region    Gastroesophageal reflux disease without esophagitis    Anemia    Lesion of false vocal cord       Social History     Social History Narrative    . No children    Disabled from back issues. Previously [employer]     "+drinks beer daily     Nonsmoker       Blood pressure 120/80, pulse 64, temperature 36.4 °C (97.5 °F), height 1.93 m (6' 4\"), weight 122 kg (269 lb 9.6 oz), SpO2 99%.  Body mass index is 32.82 kg/m².    General: NAD. Alert.   Neck:  Supple. No thyroid goiter.  Lymph nodes: No cervical or clavicular adenopathy  Cardiovascular: Regular rate rhythm S1-S2 normal no murmur. No carotid bruit.   Lungs: Clear to Auscultation without wheezing, rales, or rhonchi, Breath sounds symmetric. No use of accessory muscles  Abdomen: Positive hernia immediately above the umbilicus normoactive bowel sounds, soft, non-tender.   Extremities: No pretibial edema  Skin: skin erythematous face, upper chest.      Labs August 2024 ferritin, iron, hep C, HIV CMP, uric acid, PTT, INR, PSA, TSH, lipid, magnesium, folate, B12, CBC  Ferritin low at 18 B12 normal 276  Cholesterol 140 HDL 69 LDL 60 triglyceride 49  Creatinine 0.76 glucose 93  AST elevated 51 ALT elevated 108 uric acid elevated 8.5 PSA normal 1.22  Normal INR.  Hemoglobin low 11.8    Labs 5/2024 cbc, bmp, Mg, lipid, TSH, PSA, uric acid, PTT  Wbc low 3.6 hg 15.9 platelet 244  Na 137 K 4.4 Cr 0.73 glucose 95   Mg low 1.7  162/79/75/63  TSH 1.422  PSA 1.0  uric acid elevated 8.4  PTT 28 PT 9.8 normal, INR 0.88  folic acid 9.4 normal.   1. Alcohol dependence, daily use (Multi) (Primary)  Discussed previously.  At that time they indicated he is chronically used alcohol and did not feel ready to change  2. Hypertension, unspecified type  Blood pressure within goal  - amLODIPine-benazepriL (LotreL) 10-40 mg capsule; Take 1 capsule by mouth once daily.  Dispense: 100 capsule; Refill: 3    3. Anemia, unspecified type  Suspect secondary to alcohol    4. Elevated liver enzymes  Recommend right upper quadrant ultrasound to evaluate size of liver and see if evidence of hepatomegaly or cirrhosis.  We discussed that could also see gastroenterologist as they could do other testing such as a FibroScan " to see if there is scarring setting up in the liver  -  right upper quadrant; Future    5. Tremor  - propranolol (Inderal) 20 mg tablet; Take 1 tablet (20 mg) by mouth 2 times a day.  Dispense: 180 tablet; Refill: 3         PSA 8/2024  Colonoscopy 2024 (for bleed)    Immunization History   Administered Date(s) Administered    Flu vaccine (IIV4), preservative free *Check age/dose* 10/11/2016, 09/26/2018, 11/08/2022    Flu vaccine, quadrivalent, no egg protein, age 6 month or greater (FLUCELVAX) 01/06/2022    Flu vaccine, trivalent, preservative free, age 6 months and greater (Fluarix/Fluzone/Flulaval) 10/29/2015, 12/05/2017    Influenza, injectable, quadrivalent 11/08/2022    Influenza, seasonal, injectable 05/17/2016    Moderna SARS-CoV-2 Vaccination 03/17/2021, 04/14/2021, 01/06/2022    Tdap vaccine, age 7 year and older (BOOSTRIX, ADACEL) 01/03/2011, 05/22/2015       Current Outpatient Medications on File Prior to Visit   Medication Sig Dispense Refill    allopurinol (Zyloprim) 300 mg tablet Take 1 tablet (300 mg) by mouth once daily as needed.      amLODIPine-benazepriL (LotreL) 10-40 mg capsule Take 1 capsule by mouth once daily. 100 capsule 3    cholecalciferol (Vitamin D3) 50 MCG (2000 UT) tablet Take 1 tablet (50 mcg) by mouth once daily.      esomeprazole (NexIUM) 40 mg DR capsule TAKE 1 CAPSULE (40 MG) BY MOUTH 2 TIMES A DAY BEFORE MEALS. DO NOT OPEN CAPSULE. 180 capsule 1    metoprolol succinate XL (Toprol-XL) 50 mg 24 hr tablet Take 1 tablet (50 mg) by mouth once daily. DO NOT CRUSH OR CHEW      propranolol (Inderal) 20 mg tablet Take 1 tablet (20 mg) by mouth 2 times a day. 60 tablet 3    [DISCONTINUED] pantoprazole (ProtoNix) 40 mg EC tablet Take 1 tablet (40 mg) by mouth once daily in the morning. Take before meals. (Patient not taking: Reported on 12/18/2024) 90 tablet 3     No current facility-administered medications on file prior to visit.         Kassy Salomon MD

## 2025-01-13 ENCOUNTER — CLINICAL SUPPORT (OUTPATIENT)
Dept: PREADMISSION TESTING | Facility: HOSPITAL | Age: 65
End: 2025-01-13
Payer: COMMERCIAL

## 2025-01-13 DIAGNOSIS — Z01.818 PRE-OPERATIVE CLEARANCE: ICD-10-CM
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
## 2025-01-20 ENCOUNTER — APPOINTMENT (OUTPATIENT)
Dept: PREADMISSION TESTING | Facility: HOSPITAL | Age: 65
End: 2025-01-20
Payer: COMMERCIAL

## 2025-01-23 ENCOUNTER — PRE-ADMISSION TESTING (OUTPATIENT)
Dept: PREADMISSION TESTING | Facility: HOSPITAL | Age: 65
End: 2025-01-23
Payer: COMMERCIAL

## 2025-01-23 ENCOUNTER — LAB (OUTPATIENT)
Dept: LAB | Facility: LAB | Age: 65
End: 2025-01-23
Payer: COMMERCIAL

## 2025-01-23 VITALS
WEIGHT: 264.33 LBS | HEIGHT: 76 IN | RESPIRATION RATE: 20 BRPM | BODY MASS INDEX: 32.19 KG/M2 | DIASTOLIC BLOOD PRESSURE: 71 MMHG | SYSTOLIC BLOOD PRESSURE: 113 MMHG | HEART RATE: 68 BPM | TEMPERATURE: 97.8 F | OXYGEN SATURATION: 99 %

## 2025-01-23 DIAGNOSIS — Z01.818 PRE-OPERATIVE CLEARANCE: ICD-10-CM

## 2025-01-23 DIAGNOSIS — D50.9 IRON DEFICIENCY ANEMIA, UNSPECIFIED IRON DEFICIENCY ANEMIA TYPE: ICD-10-CM

## 2025-01-23 DIAGNOSIS — I10 HYPERTENSION, UNSPECIFIED TYPE: ICD-10-CM

## 2025-01-23 DIAGNOSIS — I10 HYPERTENSION, UNSPECIFIED TYPE: Primary | ICD-10-CM

## 2025-01-23 LAB
ALBUMIN SERPL BCP-MCNC: 3.8 G/DL (ref 3.4–5)
ALP SERPL-CCNC: 48 U/L (ref 33–136)
ALT SERPL W P-5'-P-CCNC: 37 U/L (ref 10–52)
ANION GAP SERPL CALC-SCNC: 10 MMOL/L (ref 10–20)
AST SERPL W P-5'-P-CCNC: 35 U/L (ref 9–39)
BASOPHILS # BLD AUTO: 0.06 X10*3/UL (ref 0–0.1)
BASOPHILS NFR BLD AUTO: 1 %
BILIRUB SERPL-MCNC: 1.2 MG/DL (ref 0–1.2)
BUN SERPL-MCNC: 5 MG/DL (ref 6–23)
CALCIUM SERPL-MCNC: 9.1 MG/DL (ref 8.6–10.3)
CHLORIDE SERPL-SCNC: 104 MMOL/L (ref 98–107)
CO2 SERPL-SCNC: 26 MMOL/L (ref 21–32)
CREAT SERPL-MCNC: 0.81 MG/DL (ref 0.5–1.3)
EGFRCR SERPLBLD CKD-EPI 2021: >90 ML/MIN/1.73M*2
EOSINOPHIL # BLD AUTO: 0.18 X10*3/UL (ref 0–0.7)
EOSINOPHIL NFR BLD AUTO: 2.9 %
ERYTHROCYTE [DISTWIDTH] IN BLOOD BY AUTOMATED COUNT: 19.4 % (ref 11.5–14.5)
FERRITIN SERPL-MCNC: 18 NG/ML (ref 20–300)
GLUCOSE SERPL-MCNC: 117 MG/DL (ref 74–99)
HCT VFR BLD AUTO: 40.1 % (ref 41–52)
HGB BLD-MCNC: 11.8 G/DL (ref 13.5–17.5)
IMM GRANULOCYTES # BLD AUTO: 0.03 X10*3/UL (ref 0–0.7)
IMM GRANULOCYTES NFR BLD AUTO: 0.5 % (ref 0–0.9)
IRON SATN MFR SERPL: ABNORMAL %
IRON SERPL-MCNC: 32 UG/DL (ref 35–150)
LYMPHOCYTES # BLD AUTO: 1.55 X10*3/UL (ref 1.2–4.8)
LYMPHOCYTES NFR BLD AUTO: 24.8 %
MCH RBC QN AUTO: 22.7 PG (ref 26–34)
MCHC RBC AUTO-ENTMCNC: 29.4 G/DL (ref 32–36)
MCV RBC AUTO: 77 FL (ref 80–100)
MONOCYTES # BLD AUTO: 0.73 X10*3/UL (ref 0.1–1)
MONOCYTES NFR BLD AUTO: 11.7 %
NEUTROPHILS # BLD AUTO: 3.7 X10*3/UL (ref 1.2–7.7)
NEUTROPHILS NFR BLD AUTO: 59.1 %
NRBC BLD-RTO: 0 /100 WBCS (ref 0–0)
PLATELET # BLD AUTO: 362 X10*3/UL (ref 150–450)
POTASSIUM SERPL-SCNC: 4.7 MMOL/L (ref 3.5–5.3)
PROT SERPL-MCNC: 6.3 G/DL (ref 6.4–8.2)
RBC # BLD AUTO: 5.19 X10*6/UL (ref 4.5–5.9)
SODIUM SERPL-SCNC: 135 MMOL/L (ref 136–145)
TIBC SERPL-MCNC: ABNORMAL UG/DL
UIBC SERPL-MCNC: >450 UG/DL (ref 110–370)
WBC # BLD AUTO: 6.3 X10*3/UL (ref 4.4–11.3)

## 2025-01-23 PROCEDURE — 85025 COMPLETE CBC W/AUTO DIFF WBC: CPT

## 2025-01-23 PROCEDURE — 82728 ASSAY OF FERRITIN: CPT

## 2025-01-23 PROCEDURE — 93005 ELECTROCARDIOGRAM TRACING: CPT | Performed by: NURSE PRACTITIONER

## 2025-01-23 PROCEDURE — 93010 ELECTROCARDIOGRAM REPORT: CPT | Performed by: INTERNAL MEDICINE

## 2025-01-23 PROCEDURE — 99204 OFFICE O/P NEW MOD 45 MIN: CPT | Performed by: NURSE PRACTITIONER

## 2025-01-23 PROCEDURE — 83550 IRON BINDING TEST: CPT

## 2025-01-23 PROCEDURE — 83540 ASSAY OF IRON: CPT

## 2025-01-23 PROCEDURE — 80053 COMPREHEN METABOLIC PANEL: CPT

## 2025-01-23 ASSESSMENT — ENCOUNTER SYMPTOMS
ARTHRALGIAS: 1
CONSTITUTIONAL NEGATIVE: 1
GASTROINTESTINAL NEGATIVE: 1
CARDIOVASCULAR NEGATIVE: 1
NECK NEGATIVE: 1
ENDOCRINE NEGATIVE: 1
LIMITED RANGE OF MOTION: 1
RESPIRATORY NEGATIVE: 1

## 2025-01-23 NOTE — PREPROCEDURE INSTRUCTIONS
Medication List            Accurate as of January 23, 2025  3:08 PM. Always use your most recent med list.                amLODIPine-benazepriL 10-40 mg capsule  Commonly known as: LotreL  Take 1 capsule by mouth once daily.  Medication Adjustments for Surgery: Take last dose 1 day (24 hours) before surgery  Notes to patient: Do NOT take evening before surgery and do NOT take morning of surgery.     esomeprazole 40 mg DR capsule  Commonly known as: NexIUM  TAKE 1 CAPSULE (40 MG) BY MOUTH 2 TIMES A DAY BEFORE MEALS. DO NOT OPEN CAPSULE.  Medication Adjustments for Surgery: Take/Use as prescribed     propranolol 20 mg tablet  Commonly known as: Inderal  Take 1 tablet (20 mg) by mouth 2 times a day.  Medication Adjustments for Surgery: Take/Use as prescribed     Vitamin D3 50 MCG (2000 UT) tablet  Generic drug: cholecalciferol  Medication Adjustments for Surgery: Do Not take on the morning of surgery                Preoperative Brain Exercises    What are brain exercises?  A brain exercise is any activity that engages your thinking (cognitive) skills.    What types of activities are considered brain exercises?  Jigsaw puzzles, crossword puzzles, word jumble, memory games, word search, and many more.  Many can be found free online or on your phone via a mobile katey.    Why should I do brain exercises before my surgery?  More recent research has shown brain exercise before surgery can lower the risk of postoperative delirium (confusion) which can be especially important for older adults.  Patients who did brain exercises for 5 to 10 hours the days before surgery, cut their risk of postoperative delirium in half up to 1 week after surgery.          Preoperative Deep Breathing Exercises  Why it is important to do deep breathing exercises before my surgery?  Deep breathing exercises strengthen your breathing muscles.  This helps you to recover after your surgery and decreases the chance of breathing complications.  How  are the deep breathing exercises done?  Sit straight with your back supported.  Breathe in deeply and slowly through your nose. Your lower rib cage should expand and your abdomen may move forward.  Hold that breath for 3 to 5 seconds.  Breathe out through pursed lips, slowly and completely.  Rest and repeat 10 times every hour while awake.  Rest longer if you become dizzy or lightheaded.        CONTACT SURGEON'S OFFICE IF YOU DEVELOP:  * Fever = 100.4 F   * New respiratory symptoms (e.g. cough, shortness of breath, respiratory distress, sore throat)  * Recent loss of taste or smell  *Flu like symptoms such as headache, fatigue or gastrointestinal symptoms  * You develop any open sores, shingles, burning or painful urination   AND/OR:  * You no longer wish to have the surgery.  * Any other personal circumstances change that may lead to the need to cancel or defer this surgery.  *You were admitted to any hospital within one week of your planned procedure.    SMOKING:  *Quitting smoking can make a huge difference to your health and recovery from surgery.    *If you need help with quitting, call 9-691-QUIT-NOW.    THE DAY OF SURGERY:  *Do not eat any food after midnight the night before your surgery.   *YOU MUST drink 14 OUNCES of clear liquids TWO hours before your instructed ARRIVAL TIME to the hospital. This includes water, black tea/coffee (no milk or cream), apple juice, clear broth and electrolyte drinks (Gatorade).  Please avoid clear liquids that are red in color.   *You may chew gum/mints up to TWO hours before your surgery/procedure.    SURGICAL TIME:  *You will be contacted between 2 p.m. and 6 p.m. the business day before your surgery with your arrival time.  *If you haven't received a call by 6pm, call [phone].  *Scheduled surgery times may change and you will be notified if this occurs-check your personal voicemail for any updates.    ON THE MORNING OF SURGERY:  *Wear comfortable, loose fitting  clothing.   *Do not use moisturizers, creams, lotions or perfume.  *All jewelry and valuables should be left at home.  *Prosthetic devices such as contact lenses, hearing aids, dentures, eyelash extensions, hairpins and body piercing must be removed before surgery.    BRING WITH YOU:  *Photo ID and insurance card  *Current list of medications and allergies  *Pacemaker/Defibrillator/Heart stent cards  *CPAP machine and mask  *Slings/splints/crutches  *Copy of your complete Advanced Directive/DHPOA-if applicable  *Neurostimulator implant remote    PARKING AND ARRIVAL:  *Check in at the Main Entrance desk and let them know you are here for surgery.  *You will be directed to the 2nd floor surgical waiting area.    IF YOU ARE HAVING OUTPATIENT/SAME DAY SURGERY:  *A responsible adult MUST accompany you at the time of discharge and stay with you for 24 hours after your surgery.  *You may NOT drive yourself home after surgery.  *You may use a taxi or ride sharing service ([employer], [employer]) to return home ONLY if you are accompanied by a friend or family member.  *Instructions for resuming your medications will be provided by your surgeon.
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
## 2025-01-24 LAB
ATRIAL RATE: 62 BPM
P AXIS: 44 DEGREES
P OFFSET: 202 MS
P ONSET: 137 MS
PR INTERVAL: 182 MS
Q ONSET: 228 MS
QRS COUNT: 10 BEATS
QRS DURATION: 84 MS
QT INTERVAL: 436 MS
QTC CALCULATION(BAZETT): 442 MS
QTC FREDERICIA: 441 MS
R AXIS: -27 DEGREES
T AXIS: 27 DEGREES
T OFFSET: 446 MS
VENTRICULAR RATE: 62 BPM

## 2025-01-29 ENCOUNTER — APPOINTMENT (OUTPATIENT)
Dept: SURGERY | Facility: CLINIC | Age: 65
End: 2025-01-29
Payer: COMMERCIAL

## 2025-01-29 VITALS
BODY MASS INDEX: 31.65 KG/M2 | SYSTOLIC BLOOD PRESSURE: 110 MMHG | HEART RATE: 72 BPM | DIASTOLIC BLOOD PRESSURE: 70 MMHG | TEMPERATURE: 97.2 F | WEIGHT: 260 LBS | OXYGEN SATURATION: 98 %

## 2025-01-29 DIAGNOSIS — K45.8 OTHER SPECIFIED ABDOMINAL HERNIA WITHOUT OBSTRUCTION OR GANGRENE: ICD-10-CM

## 2025-01-29 PROCEDURE — 1036F TOBACCO NON-USER: CPT | Performed by: SURGERY

## 2025-01-29 PROCEDURE — 3074F SYST BP LT 130 MM HG: CPT | Performed by: SURGERY

## 2025-01-29 PROCEDURE — 3078F DIAST BP <80 MM HG: CPT | Performed by: SURGERY

## 2025-01-29 PROCEDURE — 99214 OFFICE O/P EST MOD 30 MIN: CPT | Performed by: SURGERY
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
## 2025-01-30 ENCOUNTER — TELEPHONE (OUTPATIENT)
Dept: PRIMARY CARE | Facility: CLINIC | Age: 65
End: 2025-01-30
Payer: COMMERCIAL

## 2025-01-30 ENCOUNTER — ANESTHESIA EVENT (OUTPATIENT)
Dept: OPERATING ROOM | Facility: HOSPITAL | Age: 65
End: 2025-01-30
Payer: COMMERCIAL

## 2025-01-30 NOTE — TELEPHONE ENCOUNTER
----- Message from Kassy Chavez sent at 1/24/2025  5:01 PM EST -----  Low iron stores. Iron is low but improved from last reading. Anemia is stable. Glucose 117. Goal under 100. Protein is low-recommend increase lean protein intake such as grilled, chicken, turkey, salmon, tuna.  Recommend otc iron 325 mg daily with otc vitamin C. Also recommend see gastroenterologist to evaluate liver. Sometimes they can also arrange IV iron to help improve iron levels.

## 2025-01-31 ENCOUNTER — HOSPITAL ENCOUNTER (OUTPATIENT)
Facility: HOSPITAL | Age: 65
Setting detail: OUTPATIENT SURGERY
Discharge: HOME | End: 2025-01-31
Attending: OTOLARYNGOLOGY | Admitting: OTOLARYNGOLOGY
Payer: COMMERCIAL

## 2025-01-31 ENCOUNTER — ANESTHESIA (OUTPATIENT)
Dept: OPERATING ROOM | Facility: HOSPITAL | Age: 65
End: 2025-01-31
Payer: COMMERCIAL

## 2025-01-31 VITALS
RESPIRATION RATE: 14 BRPM | HEART RATE: 68 BPM | DIASTOLIC BLOOD PRESSURE: 75 MMHG | WEIGHT: 266.76 LBS | OXYGEN SATURATION: 94 % | TEMPERATURE: 97.2 F | SYSTOLIC BLOOD PRESSURE: 130 MMHG | BODY MASS INDEX: 32.47 KG/M2

## 2025-01-31 DIAGNOSIS — J38.7 LESION OF LARYNX: Primary | ICD-10-CM

## 2025-01-31 DIAGNOSIS — R13.10 DYSPHAGIA, UNSPECIFIED TYPE: ICD-10-CM

## 2025-01-31 DIAGNOSIS — K22.719 BARRETT'S ESOPHAGUS WITH DYSPLASIA: ICD-10-CM

## 2025-01-31 DIAGNOSIS — K22.70 BARRETT'S ESOPHAGUS WITH ESOPHAGITIS: ICD-10-CM

## 2025-01-31 DIAGNOSIS — K20.90 BARRETT'S ESOPHAGUS WITH ESOPHAGITIS: ICD-10-CM

## 2025-01-31 DIAGNOSIS — J38.3 LESION OF FALSE VOCAL CORD: ICD-10-CM

## 2025-01-31 DIAGNOSIS — G89.18 POST-OP PAIN: ICD-10-CM

## 2025-01-31 DIAGNOSIS — J39.2 CRICOPHARYNGEAL HYPERTROPHY: ICD-10-CM

## 2025-01-31 PROBLEM — R13.19 OTHER DYSPHAGIA: Status: ACTIVE | Noted: 2025-01-31

## 2025-01-31 PROCEDURE — 7100000001 HC RECOVERY ROOM TIME - INITIAL BASE CHARGE: Performed by: OTOLARYNGOLOGY

## 2025-01-31 PROCEDURE — 88305 TISSUE EXAM BY PATHOLOGIST: CPT | Performed by: PATHOLOGY

## 2025-01-31 PROCEDURE — 88312 SPECIAL STAINS GROUP 1: CPT | Performed by: PATHOLOGY

## 2025-01-31 PROCEDURE — 31541 LARYNSCOP W/TUMR EXC + SCOPE: CPT | Performed by: OTOLARYNGOLOGY

## 2025-01-31 PROCEDURE — 2720000007 HC OR 272 NO HCPCS: Performed by: OTOLARYNGOLOGY

## 2025-01-31 PROCEDURE — 2500000004 HC RX 250 GENERAL PHARMACY W/ HCPCS (ALT 636 FOR OP/ED): Performed by: NURSE ANESTHETIST, CERTIFIED REGISTERED

## 2025-01-31 PROCEDURE — C1726 CATH, BAL DIL, NON-VASCULAR: HCPCS | Performed by: OTOLARYNGOLOGY

## 2025-01-31 PROCEDURE — 2500000005 HC RX 250 GENERAL PHARMACY W/O HCPCS: Performed by: OTOLARYNGOLOGY

## 2025-01-31 PROCEDURE — 43195 ESOPHAGOSCOPY RIGID BALLOON: CPT | Performed by: OTOLARYNGOLOGY

## 2025-01-31 PROCEDURE — 0756T DGTZ GLS MCRSCP SLD SPC GRPI: CPT | Mod: TC,AHULAB,WESLAB | Performed by: OTOLARYNGOLOGY

## 2025-01-31 PROCEDURE — 3700000002 HC GENERAL ANESTHESIA TIME - EACH INCREMENTAL 1 MINUTE: Performed by: OTOLARYNGOLOGY

## 2025-01-31 PROCEDURE — A4649 SURGICAL SUPPLIES: HCPCS | Performed by: OTOLARYNGOLOGY

## 2025-01-31 PROCEDURE — 7100000010 HC PHASE TWO TIME - EACH INCREMENTAL 1 MINUTE: Performed by: OTOLARYNGOLOGY

## 2025-01-31 PROCEDURE — 88305 TISSUE EXAM BY PATHOLOGIST: CPT | Mod: TC,AHULAB,WESLAB | Performed by: OTOLARYNGOLOGY

## 2025-01-31 PROCEDURE — 7100000009 HC PHASE TWO TIME - INITIAL BASE CHARGE: Performed by: OTOLARYNGOLOGY

## 2025-01-31 PROCEDURE — 3700000001 HC GENERAL ANESTHESIA TIME - INITIAL BASE CHARGE: Performed by: OTOLARYNGOLOGY

## 2025-01-31 PROCEDURE — 3600000002 HC OR TIME - INITIAL BASE CHARGE - PROCEDURE LEVEL TWO: Performed by: OTOLARYNGOLOGY

## 2025-01-31 PROCEDURE — 3600000007 HC OR TIME - EACH INCREMENTAL 1 MINUTE - PROCEDURE LEVEL TWO: Performed by: OTOLARYNGOLOGY

## 2025-01-31 PROCEDURE — 7100000002 HC RECOVERY ROOM TIME - EACH INCREMENTAL 1 MINUTE: Performed by: OTOLARYNGOLOGY

## 2025-01-31 PROCEDURE — 2500000004 HC RX 250 GENERAL PHARMACY W/ HCPCS (ALT 636 FOR OP/ED): Performed by: OTOLARYNGOLOGY

## 2025-01-31 PROCEDURE — 31571 LARYNGOSCOP W/VC INJ + SCOPE: CPT | Performed by: OTOLARYNGOLOGY

## 2025-01-31 RX ORDER — FENTANYL CITRATE 50 UG/ML
INJECTION, SOLUTION INTRAMUSCULAR; INTRAVENOUS AS NEEDED
Status: DISCONTINUED | OUTPATIENT
Start: 2025-01-31 | End: 2025-01-31

## 2025-01-31 RX ORDER — ESOMEPRAZOLE MAGNESIUM 40 MG/1
40 CAPSULE, DELAYED RELEASE ORAL 2 TIMES DAILY
Qty: 60 CAPSULE | Refills: 1 | Status: SHIPPED | OUTPATIENT
Start: 2025-01-31 | End: 2025-04-01

## 2025-01-31 RX ORDER — EPINEPHRINE 1 MG/ML
INJECTION INTRAMUSCULAR; INTRAVENOUS; SUBCUTANEOUS AS NEEDED
Status: DISCONTINUED | OUTPATIENT
Start: 2025-01-31 | End: 2025-01-31 | Stop reason: HOSPADM

## 2025-01-31 RX ORDER — ONDANSETRON HYDROCHLORIDE 2 MG/ML
INJECTION, SOLUTION INTRAVENOUS AS NEEDED
Status: DISCONTINUED | OUTPATIENT
Start: 2025-01-31 | End: 2025-01-31

## 2025-01-31 RX ORDER — PROPOFOL 10 MG/ML
INJECTION, EMULSION INTRAVENOUS AS NEEDED
Status: DISCONTINUED | OUTPATIENT
Start: 2025-01-31 | End: 2025-01-31

## 2025-01-31 RX ORDER — SODIUM CHLORIDE 0.9 G/100ML
INJECTION, SOLUTION IRRIGATION AS NEEDED
Status: DISCONTINUED | OUTPATIENT
Start: 2025-01-31 | End: 2025-01-31 | Stop reason: HOSPADM

## 2025-01-31 RX ORDER — SODIUM CHLORIDE, SODIUM LACTATE, POTASSIUM CHLORIDE, CALCIUM CHLORIDE 600; 310; 30; 20 MG/100ML; MG/100ML; MG/100ML; MG/100ML
50 INJECTION, SOLUTION INTRAVENOUS CONTINUOUS
Status: ACTIVE | OUTPATIENT
Start: 2025-01-31 | End: 2025-01-31

## 2025-01-31 RX ORDER — ALBUTEROL SULFATE 0.83 MG/ML
2.5 SOLUTION RESPIRATORY (INHALATION) ONCE AS NEEDED
Status: DISCONTINUED | OUTPATIENT
Start: 2025-01-31 | End: 2025-01-31 | Stop reason: HOSPADM

## 2025-01-31 RX ORDER — ROCURONIUM BROMIDE 10 MG/ML
INJECTION, SOLUTION INTRAVENOUS AS NEEDED
Status: DISCONTINUED | OUTPATIENT
Start: 2025-01-31 | End: 2025-01-31

## 2025-01-31 RX ORDER — TRAMADOL HYDROCHLORIDE 50 MG/1
50 TABLET ORAL EVERY 6 HOURS PRN
Qty: 12 TABLET | Refills: 0 | Status: SHIPPED | OUTPATIENT
Start: 2025-01-31 | End: 2025-02-03

## 2025-01-31 RX ORDER — ONDANSETRON HYDROCHLORIDE 2 MG/ML
4 INJECTION, SOLUTION INTRAVENOUS ONCE AS NEEDED
Status: DISCONTINUED | OUTPATIENT
Start: 2025-01-31 | End: 2025-01-31 | Stop reason: HOSPADM

## 2025-01-31 RX ORDER — MIDAZOLAM HYDROCHLORIDE 1 MG/ML
INJECTION INTRAMUSCULAR; INTRAVENOUS AS NEEDED
Status: DISCONTINUED | OUTPATIENT
Start: 2025-01-31 | End: 2025-01-31

## 2025-01-31 RX ORDER — OXYCODONE HYDROCHLORIDE 5 MG/1
10 TABLET ORAL EVERY 4 HOURS PRN
Status: DISCONTINUED | OUTPATIENT
Start: 2025-01-31 | End: 2025-01-31 | Stop reason: HOSPADM

## 2025-01-31 RX ORDER — ACETAMINOPHEN 500 MG
1000 TABLET ORAL EVERY 8 HOURS PRN
Qty: 90 TABLET | Refills: 0 | Status: SHIPPED | OUTPATIENT
Start: 2025-01-31 | End: 2025-02-15

## 2025-01-31 RX ORDER — LIDOCAINE HYDROCHLORIDE 20 MG/ML
INJECTION, SOLUTION EPIDURAL; INFILTRATION; INTRACAUDAL; PERINEURAL AS NEEDED
Status: DISCONTINUED | OUTPATIENT
Start: 2025-01-31 | End: 2025-01-31

## 2025-01-31 RX ORDER — LIDOCAINE HYDROCHLORIDE 10 MG/ML
0.1 INJECTION, SOLUTION EPIDURAL; INFILTRATION; INTRACAUDAL; PERINEURAL ONCE
Status: DISCONTINUED | OUTPATIENT
Start: 2025-01-31 | End: 2025-01-31 | Stop reason: HOSPADM

## 2025-01-31 RX ORDER — OXYCODONE HYDROCHLORIDE 5 MG/1
5 TABLET ORAL EVERY 4 HOURS PRN
Status: DISCONTINUED | OUTPATIENT
Start: 2025-01-31 | End: 2025-01-31 | Stop reason: HOSPADM

## 2025-01-31 RX ORDER — DROPERIDOL 2.5 MG/ML
0.62 INJECTION, SOLUTION INTRAMUSCULAR; INTRAVENOUS ONCE AS NEEDED
Status: DISCONTINUED | OUTPATIENT
Start: 2025-01-31 | End: 2025-01-31 | Stop reason: HOSPADM

## 2025-01-31 RX ORDER — IBUPROFEN 400 MG/1
400 TABLET ORAL EVERY 6 HOURS PRN
Qty: 40 TABLET | Refills: 0 | Status: SHIPPED | OUTPATIENT
Start: 2025-01-31 | End: 2025-02-10

## 2025-01-31 RX ORDER — SODIUM CHLORIDE, SODIUM LACTATE, POTASSIUM CHLORIDE, CALCIUM CHLORIDE 600; 310; 30; 20 MG/100ML; MG/100ML; MG/100ML; MG/100ML
INJECTION, SOLUTION INTRAVENOUS CONTINUOUS PRN
Status: DISCONTINUED | OUTPATIENT
Start: 2025-01-31 | End: 2025-01-31

## 2025-01-31 RX ORDER — TRIAMCINOLONE ACETONIDE 40 MG/ML
INJECTION, SUSPENSION INTRA-ARTICULAR; INTRAMUSCULAR AS NEEDED
Status: DISCONTINUED | OUTPATIENT
Start: 2025-01-31 | End: 2025-01-31 | Stop reason: HOSPADM

## 2025-01-31 RX ORDER — ACETAMINOPHEN 325 MG/1
650 TABLET ORAL EVERY 4 HOURS PRN
Status: DISCONTINUED | OUTPATIENT
Start: 2025-01-31 | End: 2025-01-31 | Stop reason: HOSPADM

## 2025-01-31 RX ADMIN — ROCURONIUM BROMIDE 60 MG: 10 INJECTION, SOLUTION INTRAVENOUS at 07:41

## 2025-01-31 RX ADMIN — DEXAMETHASONE SODIUM PHOSPHATE 10 MG: 4 INJECTION, SOLUTION INTRAMUSCULAR; INTRAVENOUS at 07:55

## 2025-01-31 RX ADMIN — MIDAZOLAM HYDROCHLORIDE 2 MG: 1 INJECTION, SOLUTION INTRAMUSCULAR; INTRAVENOUS at 07:33

## 2025-01-31 RX ADMIN — LIDOCAINE HYDROCHLORIDE 100 MG: 20 INJECTION, SOLUTION EPIDURAL; INFILTRATION; INTRACAUDAL; PERINEURAL at 07:41

## 2025-01-31 RX ADMIN — FENTANYL CITRATE 50 MCG: 50 INJECTION, SOLUTION INTRAMUSCULAR; INTRAVENOUS at 08:24

## 2025-01-31 RX ADMIN — ONDANSETRON 4 MG: 2 INJECTION, SOLUTION INTRAMUSCULAR; INTRAVENOUS at 07:58

## 2025-01-31 RX ADMIN — ROCURONIUM BROMIDE 10 MG: 10 INJECTION, SOLUTION INTRAVENOUS at 08:26

## 2025-01-31 RX ADMIN — FENTANYL CITRATE 50 MCG: 50 INJECTION, SOLUTION INTRAMUSCULAR; INTRAVENOUS at 07:41

## 2025-01-31 RX ADMIN — SUGAMMADEX 200 MG: 100 INJECTION, SOLUTION INTRAVENOUS at 09:12

## 2025-01-31 RX ADMIN — ROCURONIUM BROMIDE 10 MG: 10 INJECTION, SOLUTION INTRAVENOUS at 08:22

## 2025-01-31 RX ADMIN — SODIUM CHLORIDE, POTASSIUM CHLORIDE, SODIUM LACTATE AND CALCIUM CHLORIDE: 600; 310; 30; 20 INJECTION, SOLUTION INTRAVENOUS at 07:30

## 2025-01-31 RX ADMIN — PROPOFOL 200 MG: 10 INJECTION, EMULSION INTRAVENOUS at 07:41

## 2025-01-31 RX ADMIN — ROCURONIUM BROMIDE 10 MG: 10 INJECTION, SOLUTION INTRAVENOUS at 08:55

## 2025-01-31 ASSESSMENT — PAIN - FUNCTIONAL ASSESSMENT
PAIN_FUNCTIONAL_ASSESSMENT: 0-10

## 2025-01-31 ASSESSMENT — PAIN SCALES - GENERAL
PAINLEVEL_OUTOF10: 2
PAINLEVEL_OUTOF10: 5 - MODERATE PAIN
PAINLEVEL_OUTOF10: 5 - MODERATE PAIN
PAINLEVEL_OUTOF10: 0 - NO PAIN
PAINLEVEL_OUTOF10: 2
PAINLEVEL_OUTOF10: 0 - NO PAIN

## 2025-01-31 ASSESSMENT — PAIN DESCRIPTION - DESCRIPTORS
DESCRIPTORS: ACHING;THROBBING
DESCRIPTORS: ACHING
DESCRIPTORS: ACHING;BURNING
DESCRIPTORS: ACHING

## 2025-01-31 ASSESSMENT — COLUMBIA-SUICIDE SEVERITY RATING SCALE - C-SSRS
6. HAVE YOU EVER DONE ANYTHING, STARTED TO DO ANYTHING, OR PREPARED TO DO ANYTHING TO END YOUR LIFE?: NO
2. HAVE YOU ACTUALLY HAD ANY THOUGHTS OF KILLING YOURSELF?: NO
1. IN THE PAST MONTH, HAVE YOU WISHED YOU WERE DEAD OR WISHED YOU COULD GO TO SLEEP AND NOT WAKE UP?: NO

## 2025-01-31 NOTE — DISCHARGE INSTRUCTIONS
Postoperative Instructions  Endoscopic Voice and Airway Surgery  General: These procedures typically involve using a type of scope through the mouth to inspect the deeper structures in the throat and sometimes include biopsy, balloon dilation, laser removal, Jet Ventilation or other procedures at the same time. Pain of the mouth, lips, gums, or teeth can be normal after these procedures.  Diet: Start with liquids after anesthesia. Soft foods may feel best for the first 2-3 days following surgery. Soft foods include soup, noodles, scrambled eggs, oatmeal, yogurt, smoothies, applesauce, mashed potatoes, pasta or ice cream. Avoid toast, chips, hard crusted breads, and steak or similar meats. After your throat begins to feel less sore, you can continue your normal diet.   Pain Control: You may experience a mild to moderate sore throat or tongue for several days following the procedure. The throat pain may also seem to cause earaches from referred pain. We encourage use of acetaminophen (Tylenol) and /or ibuprofen (Motrin/Advil) for most pain control. These two medications can be taken together or can be alternated. We will sometimes prescribe you something stronger for pain for “break through.”  Use it only as needed. Do not drive while taking any narcotic pain medications.  Post-Op Voice Therapy: You may be scheduled to meet postoperatively with a speech and language pathologist (SLP) who will instruct you on your voice recovery.       Follow-up: Your follow-up with your doctor should be scheduled 2-3 weeks following surgery. Biopsy results will usually be available in the system 7 days following the surgery. You will be informed of the results.     Please call the office or go to the emergency room if you experience any of the following: Difficulty breathing, Inability to swallow, Fever great than 101 degrees, Significant bleeding, or any new/concerning  symptoms.    Contact:  During office hours between 8 AM and 5 PM, please call the office at [phone].   If you have an emergency over night or on the weekend, please call [phone] and ask the  to connect you to the ENT resident on call.

## 2025-01-31 NOTE — PERIOPERATIVE NURSING NOTE
0928 Arrival to PACU. Awake and alert. Denies sore throat.     1000 Meets criteria for Phase 2.     1015 Discharge instructions reviewed with patient and so. PIV removed and pt getting dressed. Tolerating PO without difficulty swallowing.     1033 Discharged to home with so.

## 2025-01-31 NOTE — OP NOTE
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]     Procedures  Microdirect Laryngoscopy; KTP Laser; Biopsy; Steroid Injection  MN LARGSC EXC CINDY&/STRPG CORDS/EPIGL MCRSCP/TLSCP [99979]  MN LARGSC W/NJX VOCAL CORD THER W/MICRO/TELESCOPE [63647]    Flexible Esophagoscopy with Dilation, and Biopsies  05327 - MN ESOPHAGOSCOPY FLEX BALLOON DILAT <30 MM DIAM    Surgeons      * Delmis Casas - Primary    Resident/Fellow/Other Assistant:  Surgeons and Role:  * No surgeons found with a matching role *    Staff:   Circulator: Yu  Scrub Person: Angela Melgar Circulator: Ashleigh    Anesthesia Staff: Anesthesiologist: Win Cat MD  CRNA: AZIZA Lerma DNP  SRNA: Rickie Rodríguez    Procedure Summary  Anesthesia: General  ASA: III  Estimated Blood Loss: 3mL  Intra-op Medications: Administrations occurring from 0730 to 0900 on 25:  * No intraprocedure medications in log *    Specimen: No specimens collected     Drains and/or Catheters: * None in log *    Implants:     Findings:   Long-segment Ambrose's esophagitis C18M19 with SCJ 25cm from the incisors and GEJ at 43cm, s/p biopsies at the leading edge  Small sliding hiatal hernia, Hill grade 2 flap valve  Prominent CP muscle with small zenkers diverticulum pouch, s/p dilation to 20mm  1cm left posterior glottic granulomatous lesion centered on vocal process; mild papillomatous changes superiorly s/p excision. Base ablated with KTP laser and injected with 1cc Kenalog 40  No other lesions of the upper aerodigestive tract appreciated       Indications: Oswaldo Aguilar is an  64 y.o. male who is having surgery for a posterior glottic granuloma and dysphagia.    The patient was seen in the preoperative area. The risks, benefits, complications, treatment options, non-operative alternatives, expected recovery and outcomes were discussed with the patient. The possibilities of reaction to medication, pulmonary aspiration, injury to surrounding structures, bleeding, recurrent infection, the need for additional procedures, failure to diagnose a condition, and creating a complication requiring transfusion or operation were discussed with the patient. The patient concurred with the proposed plan, giving informed consent.  The site of surgery was properly noted/marked if necessary per policy. The patient has been actively warmed in preoperative area. Preoperative antibiotics are not indicated. Venous thrombosis prophylaxis have been ordered including bilateral sequential compression devices    Procedure Details:     The patient was taken to the operative suite and transferred to the operating table and laid supine. A pre-operative timeout was performed with all participating parties. General anesthesia was induced, and the patient was intubated with a 5-0 laser-safe Tenax tube. A final timeout was completed and we began with our procedure. A dental guard was used to protect the patient's teeth. The eyes, face and neck were protected appropriately from laser instrumentation.     The distal chip video esophagoscope was passed through the upper esophageal sphincter into the cervical esophagus. There was noted to be extensive esophagitis consistent with Ambrose's and several biopsies were taken at the transition zone at 25cm. This was sent for permanent pathology    A Dedo laryngoscope was used to expose the bilateral vocal folds and false folds and the patient was suspended to allow visualization of the larynx with the findings noted above. The telescope was then advanced past the glottis to  visualize the trachea to rule out any further lesions or airway narrowing with the findings noted above. Photos were taken. A wet cottonoid was placed subglottically to protect the laser safe tube.      The operating microscope was brought into the field. The lesion was first sharply excised from the vocal fold for biopsy and sent for permanent pathology. The KTP laser on a setting of 30-35 mensah, 26 pulse width and 2 pulses per second was then used to ablate the lesion wound bed and feeding vessels.  Hemostasis was achieved with epinephrine-soaked cottonoids and laser cautery as needed. 1mL of Kenalog 40mg/mL was injected into the lesion bed.    The Dedo was then placed into the postcricoid space and the esophageal balloon was then passed into the upper esophageal sphincter.  This was then dilated up to 20mm using an 18-19-20 balloon.    The subglottic cottonoid was removed, laryngotracheal anesthesia was applied, and the patient was taken out of suspension. The patient was turned back to our anesthesia colleagues for an uncomplicated wakeup.    Complications:  None; patient tolerated the procedure well.    Disposition: PACU - hemodynamically stable.  Condition: stable     Ambrose's Esophagus at 25cm from the incisors      Posterior glottic granuloma with papillomatous changes      Attending Attestation: I was present and scrubbed for the entire procedure.    Delmis Casas MD, MAEd    [employer] [employer] [facility] [employer]  [facility]  Department of Otolaryngology - Head and Neck Surgery  [facility]

## 2025-01-31 NOTE — ANESTHESIA POSTPROCEDURE EVALUATION
Patient: Oswaldo Aguilar    Procedure Summary       Date: 01/31/25 Room / Location: [facility] A OR 05 / [facility] A OR    Anesthesia Start: 0723 Anesthesia Stop: 0934    Procedures:       Microdirect Laryngoscopy; KTP Laser; Biopsy; Steroid Injection (Throat)      Flexible Esophagoscopy with Dilation (Throat) Diagnosis:       Lesion of false vocal cord      (Lesion of false vocal cord [J38.3])    Surgeons: Delmis Casas MD Responsible Provider: Win Cat MD    Anesthesia Type: general ASA Status: 3            Anesthesia Type: general    Vitals Value Taken Time   /72 01/31/25 0947   Temp 36.2 °C (97.2 °F) 01/31/25 0928   Pulse 68 01/31/25 0956   Resp 20 01/31/25 0945   SpO2 97 % 01/31/25 0956   Vitals shown include unfiled device data.    Anesthesia Post Evaluation    Patient location during evaluation: PACU  Patient participation: complete - patient participated  Level of consciousness: awake  Pain management: adequate  Airway patency: patent  Cardiovascular status: acceptable and hemodynamically stable  Respiratory status: acceptable and spontaneous ventilation  Hydration status: acceptable  Postoperative Nausea and Vomiting: none        There were no known notable events for this encounter.
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
## 2025-01-31 NOTE — ANESTHESIA PREPROCEDURE EVALUATION
Patient: Oswaldo Aguilar    Procedure Information       Date/Time: 01/31/25 0730    Procedures:       Microdirect Laryngoscopy; KTP Laser; Biopsy; Steroid Injection (Throat)      Flexible Esophagoscopy with Dilation (Throat)    Location: [facility] A OR 05 / [facility] A OR    Surgeons: Delmis Casas MD            Relevant Problems   Anesthesia (within normal limits)      Cardiac   (+) HTN (hypertension)   (+) Hypertriglyceridemia      Neuro   (+) Peripheral neuropathy      GI   (+) Colon cancer (Multi)   (+) Gastroesophageal reflux disease without esophagitis   (+) Other dysphagia      Liver   (+) Colon cancer (Multi)      Hematology   (+) Anemia      Musculoskeletal   (+) Primary osteoarthritis of both knees   (+) Shoulder pain   (+) Spinal stenosis of lumbar region      Nervous   (+) History of back surgery   (+) S/P lumbar fusion      ENT   (+) Lesion of larynx      Gastrointestinal and Abdominal   (+) Status post partial colectomy      Musculoskeletal and Injuries   (+) Postlaminectomy syndrome of lumbar region      Mental Health   (+) Alcohol dependence, daily use (Multi)       Clinical information reviewed:    Allergies                NPO Detail:  NPO/Void Status  Carbohydrate Drink Given Prior to Surgery? : N  Date of Last Liquid: 01/31/25  Time of Last Liquid: 0400  Date of Last Solid: 01/30/25  Time of Last Solid: 2200  Last Intake Type: Clear fluids  Time of Last Void: 0600         Physical Exam    Airway  Mallampati: I  TM distance: >3 FB  Neck ROM: full     Cardiovascular - normal exam     Dental   (+) upper dentures     Pulmonary - normal exam     Abdominal            Anesthesia Plan    History of general anesthesia?: yes  History of complications of general anesthesia?: no    ASA 3     general     intravenous induction   Postoperative administration of opioids is intended.  Anesthetic plan and risks discussed with patient.  Use of blood products discussed with patient who consented to blood products.     Plan discussed with attending.

## 2025-02-10 LAB
LABORATORY COMMENT REPORT: NORMAL
PATH REPORT.FINAL DX SPEC: NORMAL
PATH REPORT.GROSS SPEC: NORMAL
PATH REPORT.RELEVANT HX SPEC: NORMAL
PATH REPORT.TOTAL CANCER: NORMAL

## 2025-02-11 ENCOUNTER — APPOINTMENT (OUTPATIENT)
Dept: OTOLARYNGOLOGY | Facility: CLINIC | Age: 65
End: 2025-02-11
Payer: COMMERCIAL

## 2025-02-19 ENCOUNTER — APPOINTMENT (OUTPATIENT)
Dept: GASTROENTEROLOGY | Facility: HOSPITAL | Age: 65
End: 2025-02-19
Payer: COMMERCIAL

## 2025-03-17 ENCOUNTER — APPOINTMENT (OUTPATIENT)
Dept: SURGERY | Facility: CLINIC | Age: 65
End: 2025-03-17
Payer: COMMERCIAL

## 2025-03-17 DIAGNOSIS — K45.8 OTHER SPECIFIED ABDOMINAL HERNIA WITHOUT OBSTRUCTION OR GANGRENE: Primary | ICD-10-CM

## 2025-03-17 PROCEDURE — 99214 OFFICE O/P EST MOD 30 MIN: CPT | Performed by: SURGERY

## 2025-03-17 PROCEDURE — 1036F TOBACCO NON-USER: CPT | Performed by: SURGERY
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
## 2025-04-01 DIAGNOSIS — I10 PRIMARY HYPERTENSION: ICD-10-CM

## 2025-04-01 RX ORDER — METOPROLOL SUCCINATE 50 MG/1
50 TABLET, EXTENDED RELEASE ORAL DAILY
Qty: 90 TABLET | Refills: 3 | Status: SHIPPED | OUTPATIENT
Start: 2025-04-01

## 2025-05-12 ENCOUNTER — APPOINTMENT (OUTPATIENT)
Dept: PRIMARY CARE | Facility: CLINIC | Age: 65
End: 2025-05-12
Payer: COMMERCIAL

## 2025-05-21 ENCOUNTER — APPOINTMENT (OUTPATIENT)
Dept: ORTHOPEDIC SURGERY | Facility: CLINIC | Age: 65
End: 2025-05-21
Payer: MEDICARE

## 2025-05-21 ENCOUNTER — HOSPITAL ENCOUNTER (OUTPATIENT)
Dept: RADIOLOGY | Facility: EXTERNAL LOCATION | Age: 65
Discharge: HOME | End: 2025-05-21

## 2025-05-21 DIAGNOSIS — M25.561 CHRONIC PAIN OF BOTH KNEES: ICD-10-CM

## 2025-05-21 DIAGNOSIS — G89.29 CHRONIC PAIN OF BOTH KNEES: ICD-10-CM

## 2025-05-21 DIAGNOSIS — M17.0 PRIMARY OSTEOARTHRITIS OF BOTH KNEES: Primary | ICD-10-CM

## 2025-05-21 DIAGNOSIS — M25.562 CHRONIC PAIN OF BOTH KNEES: ICD-10-CM

## 2025-05-21 PROCEDURE — 1036F TOBACCO NON-USER: CPT | Performed by: FAMILY MEDICINE

## 2025-05-21 PROCEDURE — 99213 OFFICE O/P EST LOW 20 MIN: CPT | Mod: 25 | Performed by: FAMILY MEDICINE

## 2025-05-21 PROCEDURE — 2500000004 HC RX 250 GENERAL PHARMACY W/ HCPCS (ALT 636 FOR OP/ED): Performed by: FAMILY MEDICINE

## 2025-05-21 PROCEDURE — 20611 DRAIN/INJ JOINT/BURSA W/US: CPT | Mod: 50 | Performed by: FAMILY MEDICINE

## 2025-05-21 PROCEDURE — 99213 OFFICE O/P EST LOW 20 MIN: CPT | Performed by: FAMILY MEDICINE

## 2025-05-21 RX ORDER — TRIAMCINOLONE ACETONIDE 40 MG/ML
40 INJECTION, SUSPENSION INTRA-ARTICULAR; INTRAMUSCULAR
Status: COMPLETED | OUTPATIENT
Start: 2025-05-21 | End: 2025-05-21

## 2025-05-21 RX ORDER — LIDOCAINE HYDROCHLORIDE 20 MG/ML
2 INJECTION, SOLUTION INFILTRATION; PERINEURAL
Status: COMPLETED | OUTPATIENT
Start: 2025-05-21 | End: 2025-05-21

## 2025-05-21 RX ADMIN — LIDOCAINE HYDROCHLORIDE 2 ML: 20 INJECTION, SOLUTION INFILTRATION; PERINEURAL at 14:55

## 2025-05-21 RX ADMIN — TRIAMCINOLONE ACETONIDE 40 MG: 400 INJECTION, SUSPENSION INTRA-ARTICULAR; INTRAMUSCULAR at 14:55
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
## 2025-06-05 ENCOUNTER — APPOINTMENT (OUTPATIENT)
Dept: PRIMARY CARE | Facility: CLINIC | Age: 65
End: 2025-06-05

## 2025-06-11 ENCOUNTER — APPOINTMENT (OUTPATIENT)
Dept: ORTHOPEDIC SURGERY | Facility: CLINIC | Age: 65
End: 2025-06-11
Payer: MEDICARE

## 2025-06-25 ENCOUNTER — APPOINTMENT (OUTPATIENT)
Dept: PRIMARY CARE | Facility: CLINIC | Age: 65
End: 2025-06-25
Payer: MEDICARE

## 2025-06-25 VITALS
OXYGEN SATURATION: 98 % | SYSTOLIC BLOOD PRESSURE: 130 MMHG | TEMPERATURE: 97.7 F | BODY MASS INDEX: 33.56 KG/M2 | WEIGHT: 275.6 LBS | HEIGHT: 76 IN | DIASTOLIC BLOOD PRESSURE: 80 MMHG | HEART RATE: 70 BPM

## 2025-06-25 DIAGNOSIS — R94.5 ABNORMAL RESULTS OF LIVER FUNCTION STUDIES: ICD-10-CM

## 2025-06-25 DIAGNOSIS — M10.9 GOUT, UNSPECIFIED CAUSE, UNSPECIFIED CHRONICITY, UNSPECIFIED SITE: ICD-10-CM

## 2025-06-25 DIAGNOSIS — Z12.5 SCREENING FOR PROSTATE CANCER: ICD-10-CM

## 2025-06-25 DIAGNOSIS — R74.8 ELEVATED LIVER ENZYMES: ICD-10-CM

## 2025-06-25 DIAGNOSIS — F10.20 ALCOHOL DEPENDENCE, DAILY USE (MULTI): Primary | ICD-10-CM

## 2025-06-25 DIAGNOSIS — K21.9 GASTROESOPHAGEAL REFLUX DISEASE WITHOUT ESOPHAGITIS: ICD-10-CM

## 2025-06-25 DIAGNOSIS — E78.1 HYPERTRIGLYCERIDEMIA: ICD-10-CM

## 2025-06-25 DIAGNOSIS — D64.9 ANEMIA, UNSPECIFIED TYPE: ICD-10-CM

## 2025-06-25 DIAGNOSIS — I10 HYPERTENSION, UNSPECIFIED TYPE: ICD-10-CM

## 2025-06-25 DIAGNOSIS — Z23 NEED FOR PROPHYLACTIC VACCINATION AGAINST STREPTOCOCCUS PNEUMONIAE (PNEUMOCOCCUS): ICD-10-CM

## 2025-06-25 DIAGNOSIS — E55.9 VITAMIN D DEFICIENCY: ICD-10-CM

## 2025-06-25 PROCEDURE — G0009 ADMIN PNEUMOCOCCAL VACCINE: HCPCS | Performed by: INTERNAL MEDICINE

## 2025-06-25 PROCEDURE — 3079F DIAST BP 80-89 MM HG: CPT | Performed by: INTERNAL MEDICINE

## 2025-06-25 PROCEDURE — 90677 PCV20 VACCINE IM: CPT | Performed by: INTERNAL MEDICINE

## 2025-06-25 PROCEDURE — 99214 OFFICE O/P EST MOD 30 MIN: CPT | Performed by: INTERNAL MEDICINE

## 2025-06-25 PROCEDURE — 3008F BODY MASS INDEX DOCD: CPT | Performed by: INTERNAL MEDICINE

## 2025-06-25 PROCEDURE — 1036F TOBACCO NON-USER: CPT | Performed by: INTERNAL MEDICINE

## 2025-06-25 PROCEDURE — 3075F SYST BP GE 130 - 139MM HG: CPT | Performed by: INTERNAL MEDICINE

## 2025-06-25 RX ORDER — PANTOPRAZOLE SODIUM 40 MG/1
40 TABLET, DELAYED RELEASE ORAL
Qty: 90 TABLET | Refills: 3 | Status: SHIPPED | OUTPATIENT
Start: 2025-06-25

## 2025-06-25 RX ORDER — FOLIC ACID 1 MG/1
1 TABLET ORAL DAILY
Qty: 100 TABLET | Refills: 2 | Status: SHIPPED | OUTPATIENT
Start: 2025-06-25 | End: 2026-06-25

## 2025-06-25 RX ORDER — PANTOPRAZOLE SODIUM 40 MG/1
40 TABLET, DELAYED RELEASE ORAL
COMMUNITY
Start: 2025-04-09 | End: 2025-06-25 | Stop reason: SDUPTHER

## 2025-06-25 SDOH — ECONOMIC STABILITY: FOOD INSECURITY: WITHIN THE PAST 12 MONTHS, YOU WORRIED THAT YOUR FOOD WOULD RUN OUT BEFORE YOU GOT MONEY TO BUY MORE.: NEVER TRUE

## 2025-06-25 SDOH — ECONOMIC STABILITY: FOOD INSECURITY: WITHIN THE PAST 12 MONTHS, THE FOOD YOU BOUGHT JUST DIDN'T LAST AND YOU DIDN'T HAVE MONEY TO GET MORE.: NEVER TRUE

## 2025-06-25 ASSESSMENT — PAIN SCALES - GENERAL: PAINLEVEL_OUTOF10: 5

## 2025-06-25 ASSESSMENT — PATIENT HEALTH QUESTIONNAIRE - PHQ9
SUM OF ALL RESPONSES TO PHQ9 QUESTIONS 1 & 2: 2
1. LITTLE INTEREST OR PLEASURE IN DOING THINGS: SEVERAL DAYS
2. FEELING DOWN, DEPRESSED OR HOPELESS: SEVERAL DAYS

## 2025-06-25 NOTE — PROGRESS NOTES
Chief Complaint   Patient presents with    Follow-up     Pt here for FUV to discuss disability benefits.        Accompanied by his wife  last visit 12/2024   previous admission  lower GI bleed.  EGD and colonoscopy. 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  Colonoscopy showed diverticulosis, partial cecectomy, partial left hemicolectomy.  EGD showed nodule left vocal cord and area with appearance of Ambrose's.  drinks about 6-12 beers a day.  He has done this for many years.    history of vocal cord nodule seen during EGD. Patient was to see  Dr Aponte.Patient indicates did not make appt.   Chronic knee and back pain.     neurologist Dr. Rodríguez diagnosed with essential tremor.   Metoprolol changed  to propranolol.  Tremor improved dramatically  ENT saw a nurse practitioner Maricruz and Dr. Casas.  Lower endoscopy.  Left posterior glottic lesion.  Chest x-ray ordered.  His surgery scheduled for January 31, 2025  Ortho Dr. Simon right shoulder injection.  He indicates the injection did help his shoulder pain  He sees Dr Simon periodically for injections  Needs form completed for insurance indicating has chronic medical conditions. This is so he can continue to receive money/monthly for food.  Continues to drink.   Denies blood in urine or stool. Denies abd pain.     HPI:  Oswaldo Newmanzainab 64 y.o.   male   Patient Active Problem List   Diagnosis    Arthritis of knee    Bilateral knee pain    Colon cancer (Multi)    Elevated liver enzymes    Fatigue    HTN (hypertension)    Hypertriglyceridemia    Obesity (BMI 30-39.9)    Peripheral neuropathy    Status post partial colectomy    Shoulder pain    Tremor    History of back surgery    Alcohol dependence, daily use (Multi)    Gout    Spinal stenosis of lumbar region    S/P lumbar fusion    Primary osteoarthritis of both knees    Postlaminectomy syndrome of lumbar region    Gastroesophageal reflux disease without esophagitis    Anemia    Lesion of larynx  "   Other dysphagia       Social History     Social History Narrative    . No children    Disabled from back issues. Previously [employer]    +drinks beer daily     Nonsmoker       Blood pressure 130/80, pulse 70, temperature 36.5 °C (97.7 °F), height 1.93 m (6' 4\"), weight 125 kg (275 lb 9.6 oz), SpO2 98%.  Body mass index is 33.55 kg/m².    General: NAD. Alert.   Neck:  Supple. No thyroid goiter.  Lymph nodes: No cervical or clavicular adenopathy  Cardiovascular: Regular rate rhythm S1-S2 normal no murmur. No carotid bruit.   Lungs: Clear to Auscultation without wheezing, rales, or rhonchi, Breath sounds symmetric. No use of accessory muscles  Abdomen: normoactive bowel sounds soft, non-tender.   Extremities: No pretibial edema  Skin: skin erythematous face, upper chest.      Labs 01/2025 including irons studies      Labs August 2024 ferritin, iron, hep C, HIV CMP, uric acid, PTT, INR, PSA, TSH, lipid, magnesium, folate, B12, CBC  Ferritin low at 18 B12 normal 276  Cholesterol 140 HDL 69 LDL 60 triglyceride 49  Creatinine 0.76 glucose 93  AST elevated 51 ALT elevated 108 uric acid elevated 8.5 PSA normal 1.22  Normal INR.  Hemoglobin low 11.8    Labs 5/2024 cbc, bmp, Mg, lipid, TSH, PSA, uric acid, PTT  Wbc low 3.6 hg 15.9 platelet 244  Na 137 K 4.4 Cr 0.73 glucose 95   Mg low 1.7  162/79/75/63  TSH 1.422  PSA 1.0  uric acid elevated 8.4  PTT 28 PT 9.8 normal, INR 0.88  folic acid 9.4 normal.     1. Alcohol dependence, daily use (Multi) (Primary)  Not ready to quit, advised b complex and folic acid.   - pantoprazole (ProtoNix) 40 mg EC tablet; Take 1 tablet (40 mg) by mouth once daily in the morning. Take before meals.  Dispense: 90 tablet; Refill: 3  - Vitamin B12; Future  - Vitamin B12  - folic acid (Folvite) 1 mg tablet; Take 1 tablet (1 mg) by mouth once daily.  Dispense: 100 tablet; Refill: 2    2. Gastroesophageal reflux disease without esophagitis  Reports shelley's esophagus. Discussed " increase risk of cancer especially since still drinking. F/up with GI.   - pantoprazole (ProtoNix) 40 mg EC tablet; Take 1 tablet (40 mg) by mouth once daily in the morning. Take before meals.  Dispense: 90 tablet; Refill: 3    3. Anemia, unspecified type  Denies symptoms at this time.   - CBC; Future  - Vitamin B12; Future  - CBC  - Vitamin B12  - Iron and TIBC; Future  - Ferritin; Future  - Iron and TIBC  - Ferritin    4. Hypertension, unspecified type  - Lipid Panel; Future  - Comprehensive Metabolic Panel; Future  - CBC; Future  - Lipid Panel  - Comprehensive Metabolic Panel  - CBC    5. Hypertriglyceridemia  - Lipid Panel; Future  - Lipid Panel    6. Gout, unspecified cause, unspecified chronicity, unspecified site  - Uric acid; Future  - Uric acid    7. Vitamin D deficiency  - Vitamin D 25-Hydroxy,Total (for eval of Vitamin D levels); Future  - Vitamin D 25-Hydroxy,Total (for eval of Vitamin D levels)    8. Need for prophylactic vaccination against Streptococcus pneumoniae (pneumococcus)  - Pneumococcal conjugate vaccine, 20-valent (PREVNAR 20)    9. Elevated liver enzymes  - Protime-INR; Future  - Protime-INR    10. Abnormal results of liver function studies  - Protime-INR; Future  - Protime-INR    PSA 8/2024 ordered  Colonoscopy 2024 (for bleed  Pneumovax today. Recommend mmr, shingles, rsv.     Immunization History   Administered Date(s) Administered    Flu vaccine (IIV4), preservative free *Check age/dose* 10/11/2016, 09/26/2018, 11/08/2022    Flu vaccine, quadrivalent, no egg protein, age 6 month or greater (FLUCELVAX) 01/06/2022    Flu vaccine, trivalent, preservative free, age 6 months and greater (Fluarix/Fluzone/Flulaval) 10/29/2015, 12/05/2017, 12/18/2024    Influenza, injectable, quadrivalent 11/08/2022    Influenza, seasonal, injectable 05/17/2016    Moderna SARS-CoV-2 Vaccination 03/17/2021, 04/14/2021, 01/06/2022    Pneumococcal conjugate vaccine, 20-valent (PREVNAR 20) 06/25/2025    Tdap  vaccine, age 7 year and older (BOOSTRIX, ADACEL) 01/03/2011, 05/22/2015       Current Outpatient Medications on File Prior to Visit   Medication Sig Dispense Refill    amLODIPine-benazepriL (LotreL) 10-40 mg capsule Take 1 capsule by mouth once daily. 100 capsule 3    cholecalciferol (Vitamin D3) 50 MCG (2000 UT) tablet Take 1 tablet (50 mcg) by mouth once daily.      propranolol (Inderal) 20 mg tablet Take 1 tablet (20 mg) by mouth 2 times a day. 180 tablet 3    [DISCONTINUED] pantoprazole (ProtoNix) 40 mg EC tablet Take 1 tablet (40 mg) by mouth once daily in the morning. Take before meals.      [DISCONTINUED] esomeprazole (NexIUM) 40 mg DR capsule Take 1 capsule (40 mg) by mouth 2 times a day. Do not open capsule. (Patient not taking: Reported on 6/25/2025) 60 capsule 1    [DISCONTINUED] metoprolol succinate XL (Toprol-XL) 50 mg 24 hr tablet TAKE 1 TABLET (50 MG) BY MOUTH EVERY DAY DO NOT CRUSH OR CHEW (Patient not taking: Reported on 6/25/2025) 90 tablet 3     No current facility-administered medications on file prior to visit.         Kassy Salomon MD

## 2025-06-30 ENCOUNTER — APPOINTMENT (OUTPATIENT)
Dept: RADIOLOGY | Facility: CLINIC | Age: 65
End: 2025-06-30
Payer: MEDICARE

## 2025-09-04 DIAGNOSIS — Z00.00 WELL ADULT EXAM: ICD-10-CM

## (undated) DEVICE — SOLUTION KIT, ANTIFOG, 6CC, LF

## (undated) DEVICE — Device

## (undated) DEVICE — GLOVE, SURGEON, PREMIERPRO PI, SZ-6.5, PF, WH

## (undated) DEVICE — DENTITION PROTECTOR, QUICKGUARD, NON-PERF

## (undated) DEVICE — MARKER, SKIN, DUAL TIP INK W/9 LABEL AND REMOVABLE TIME OUT SLEEVE

## (undated) DEVICE — CONTAINER, SPECIMEN, 120 ML, STERILE

## (undated) DEVICE — DEVICE, INFLATION, CRE, 60CC, STERILE

## (undated) DEVICE — DEVICE, HERCULES 100,  ESOPH BALLOON, 18-19-20

## (undated) DEVICE — NEEDLE, HYPODERMIC, 23 GA X 1.5 IN

## (undated) DEVICE — SPONGE, NEURO, 1/2 X 1/2IN, STERILE, LF

## (undated) DEVICE — DRESSING, NON-ADHERENT, TELFA, OUCHLESS, 3 X 8 IN, STERILE

## (undated) DEVICE — SYRINGE, LUER LOCK, 12ML

## (undated) DEVICE — NEEDLE, HYPODERMIC, MONOJECT, 18 G X 1.5 IN

## (undated) DEVICE — KIT, MINOR, DOUBLE BASIN

## (undated) DEVICE — SYRINGE, 1 CC, LUER LOCK

## (undated) DEVICE — SYRINGE, 50 CC, LUER LOCK

## (undated) DEVICE — COVER, BACK TABLE, 65 X 90, HVY REINFORCED

## (undated) DEVICE — TOWEL, SURGICAL, NEURO, O/R, 16 X 26, BLUE, STERILE

## (undated) DEVICE — TRAY, MINOR, SINGLE BASIN, STERILE

## (undated) DEVICE — NEEDLE, INJECTION, OROTRACHEAL

## (undated) DEVICE — TUBING, SUCTION, NON-CONDUCTIVE, W/CONNECT,.25 IN X 12 FT, STERILE, LF